# Patient Record
Sex: FEMALE | Race: WHITE | Employment: FULL TIME | URBAN - METROPOLITAN AREA
[De-identification: names, ages, dates, MRNs, and addresses within clinical notes are randomized per-mention and may not be internally consistent; named-entity substitution may affect disease eponyms.]

---

## 2021-01-22 ENCOUNTER — TRANSCRIBE ORDER (OUTPATIENT)
Dept: SCHEDULING | Age: 56
End: 2021-01-22

## 2021-01-22 DIAGNOSIS — Z12.31 SCREENING MAMMOGRAM FOR HIGH-RISK PATIENT: Primary | ICD-10-CM

## 2021-04-02 ENCOUNTER — HOSPITAL ENCOUNTER (EMERGENCY)
Age: 56
Discharge: HOME | End: 2021-04-02
Attending: EMERGENCY MEDICINE
Payer: COMMERCIAL

## 2021-04-02 VITALS
SYSTOLIC BLOOD PRESSURE: 139 MMHG | HEART RATE: 56 BPM | OXYGEN SATURATION: 100 % | BODY MASS INDEX: 24.29 KG/M2 | TEMPERATURE: 98.2 F | RESPIRATION RATE: 18 BRPM | WEIGHT: 164 LBS | HEIGHT: 69 IN | DIASTOLIC BLOOD PRESSURE: 79 MMHG

## 2021-04-02 DIAGNOSIS — H43.392 VITREOUS FLOATERS OF LEFT EYE: Primary | ICD-10-CM

## 2021-04-02 PROCEDURE — 99283 EMERGENCY DEPT VISIT LOW MDM: CPT

## 2021-04-02 NOTE — ED NOTES
I have reviewed discharge instructions with the patient. The patient verbalized understanding. Patient left ED via Discharge Method: ambulatory to Home with family member. Opportunity for questions and clarification provided. Patient given 0 scripts. To continue your aftercare when you leave the hospital, you may receive an automated call from our care team to check in on how you are doing. This is a free service and part of our promise to provide the best care and service to meet your aftercare needs.  If you have questions, or wish to unsubscribe from this service please call [phone]. Thank you for Choosing our [facility] Emergency Department.

## 2021-04-02 NOTE — ED TRIAGE NOTES
Pt reports a \"floater\" in her left eye field x 1 week, now with a \"vail\" over her entire left field of vision since this morning.

## 2021-04-02 NOTE — ED PROVIDER NOTES
Patient is a 43-year-old female with no significant past medical history who comes to the emergency department today complaining of a \"floater\" in the left eye for a week or so. Today she noted a bit of a cloud or a veil over her vision in the left eye as well. There is no eye pain. No trauma or injury. She is not on anticoagulants. Her vision is fine with her glasses on. She works here at the hospital at [employer] and called in today and they told her she needed to come to the emergency department. The history is provided by the patient. Eye Problem   This is a chronic problem. The current episode started more than 1 week ago. The problem has been gradually worsening. The left eye is affected. The injury mechanism was none. The patient is experiencing no pain. There is no history of trauma to the eye. There is no known exposure to pink eye. Associated symptoms include decreased vision. Pertinent negatives include no numbness, no discharge, no double vision, no foreign body sensation, no photophobia, no eye redness, no nausea, no vomiting, no weakness, no fever, no pain and no head injury. She has tried nothing for the symptoms.         Past Medical History:   Diagnosis Date    Anxiety     Depression     Osteoporosis     Pneumonia     Sleep disorder     Stomach ulcer     UTI (urinary tract infection)        Past Surgical History:   Procedure Laterality Date    HX HYSTERECTOMY  2007    HX KNEE REPLACEMENT Right 2014    HX ORTHOPAEDIC Left 2002    $ surgeries left hand same yr    MO ABDOMEN SURGERY PROC UNLISTED           Family History:   Problem Relation Age of Onset   Gillian Schultz COPD Mother     Heart Disease Mother     Hypertension Mother     Cancer Father         colon ca    Cancer Maternal Grandfather         skin ca       Social History     Socioeconomic History    Marital status: SINGLE     Spouse name: Not on file    Number of children: Not on file    Years of education: Not on file    Highest education level: Not on file   Occupational History    Not on file   Social Needs    Financial resource strain: Not on file    Food insecurity     Worry: Not on file     Inability: Not on file    Transportation needs     Medical: Not on file     Non-medical: Not on file   Tobacco Use    Smoking status: Former Smoker     Packs/day: 0.50     Years: 10.00     Pack years: 5.00     Types: Cigarettes     Quit date:      Years since quittin.2    Smokeless tobacco: Never Used   Substance and Sexual Activity    Alcohol use: Not Currently     Frequency: Monthly or less     Comment: occ beer    Drug use: Not on file    Sexual activity: Not on file   Lifestyle    Physical activity     Days per week: Not on file     Minutes per session: Not on file    Stress: Not on file   Relationships    Social connections     Talks on phone: Not on file     Gets together: Not on file     Attends Gnosticist service: Not on file     Active member of club or organization: Not on file     Attends meetings of clubs or organizations: Not on file     Relationship status: Not on file    Intimate partner violence     Fear of current or ex partner: Not on file     Emotionally abused: Not on file     Physically abused: Not on file     Forced sexual activity: Not on file   Other Topics Concern    Not on file   Social History Narrative    Not on file         ALLERGIES: Cephalexin, Penicillins, and Abilify [aripiprazole]    Review of Systems   Constitutional: Negative for chills, fatigue and fever. HENT: Negative for congestion, rhinorrhea and sore throat. Eyes: Positive for visual disturbance. Negative for double vision, photophobia, pain, discharge, redness and itching. Respiratory: Negative for cough and shortness of breath. Cardiovascular: Negative for chest pain and palpitations. Gastrointestinal: Negative for abdominal pain, diarrhea, nausea and vomiting.    Endocrine: Negative for polydipsia and polyuria. Genitourinary: Negative for dysuria, frequency and urgency. Musculoskeletal: Negative for back pain and neck pain. Skin: Negative for rash. Neurological: Negative for seizures, syncope, weakness and numbness. Hematological: Negative. Vitals:    04/02/21 1559   BP: (!) 140/83   Pulse: (!) 56   Resp: 18   Temp: 98.2 °F (36.8 °C)   SpO2: 100%   Weight: 74.4 kg (164 lb)   Height: 5' 9\" (1.753 m)            Physical Exam  Vitals signs and nursing note reviewed. Constitutional:       Appearance: She is well-developed. HENT:      Head: Normocephalic and atraumatic. Eyes:      General: Lids are normal. Lids are everted, no foreign bodies appreciated. Vision grossly intact. Gaze aligned appropriately. No visual field deficit or scleral icterus. Extraocular Movements: Extraocular movements intact. Conjunctiva/sclera: Conjunctivae normal.      Pupils: Pupils are equal, round, and reactive to light. Visual Fields: Right eye visual fields normal and left eye visual fields normal.   Neck:      Musculoskeletal: Normal range of motion and neck supple. No muscular tenderness. Cardiovascular:      Pulses: Normal pulses. Abdominal:      Tenderness: There is no guarding or rebound. Lymphadenopathy:      Cervical: No cervical adenopathy. Skin:     General: Skin is warm and dry. Findings: No rash. Neurological:      General: No focal deficit present. Mental Status: She is alert and oriented to person, place, and time. GCS: GCS eye subscore is 4. GCS verbal subscore is 5. GCS motor subscore is 6. Cranial Nerves: No cranial nerve deficit. Sensory: No sensory deficit. Motor: No weakness. Coordination: Coordination normal.          MDM  Number of Diagnoses or Management Options  Diagnosis management comments: I wore appropriate PPE throughout this patient's ED visit.  James Scott MD, 4:37 PM      5:15 PM  I discussed the case with Dr. Marito Briseno who is on-call for ophthalmology. He thinks this likely represents a vitreous detachment however since it is changing he would like to further evaluate you with the retina he will see the patient in his office tomorrow morning at 10 AM.    Voice dictation software was used during the making of this note. This software is not perfect and grammatical and other typographical errors may be present. This note has been proofread, but may still contain errors.   Sonny Hanna MD; 4/2/2021 @5:16 PM   ===================================================================           Amount and/or Complexity of Data Reviewed  Discuss the patient with other providers: yes    Risk of Complications, Morbidity, and/or Mortality  Presenting problems: low  Diagnostic procedures: minimal    Patient Progress  Patient progress: stable         Procedures

## 2021-04-05 ENCOUNTER — PATIENT OUTREACH (OUTPATIENT)
Dept: OTHER | Age: 56
End: 2021-04-05

## 2021-04-05 NOTE — PROGRESS NOTES
Patient on report as eligible for Case Management. Left discreet message on voicemail with this CM contact information. Will attempt to contact again to offer [phone] Management services.

## 2021-04-06 ENCOUNTER — PATIENT OUTREACH (OUTPATIENT)
Dept: OTHER | Age: 56
End: 2021-04-06

## 2021-04-06 NOTE — LETTER
Ms. Osiel Robbins [address] [place] [id number] Dear Queen Candelario, My name is Fortunato Benítez LPN, Associate Care Manager for [facility] and I have been trying to reach you, for follow up after your recent ER visit. The Associate Care Management (ACM) program is a free-of-charge confidential service provided to our associates and their family members covered by the [facility]. The program will provide an associate and his/her family with the [address] [place] [address] expertise to assist in navigating the health care delivery system, provider services, and their overall care needsso as to assure and improve health care interactions and enhance the quality of life. This program is designed to provide you with the opportunity to have a [facility] partner with you for the following services: 
 
 1) when you come home from the hospital or emergency room 2) when help is needed to manage your disease 3) when you need assistance coordinating services or appointments 4) when you need additional education, resources or assistance reaching your Be Well Health Program goals/requirements such as Be Well With Diabetes [address] [place] [address] is dedicated to empowering the good health of its community and improving the quality of care and care experiences for associates and their families. We are committed to safeguarding patient confidentiality and privacy, assuring that every associate has the respect he or she deserves in managing their health. The information shared with your care manager will not be shared with anyone else aside from those you identify as part of your care team, and will only be used to assist you with any identified care needs. Please contact me if you would like this service provided to you. Sincerely, Fortunato Benítez LPN  [facility] Care Coordinator [address] [place] [address] [address] [phone] F [phone]  [email or web address]

## 2021-04-06 NOTE — PROGRESS NOTES
Patient identified as eligible for [address] [place] [address] services. Second telephone outreach attempted. Left discreet voicemail with this CM confidential contact information. Will send UTR letter.

## 2021-05-06 ENCOUNTER — PATIENT OUTREACH (OUTPATIENT)
Dept: OTHER | Age: 56
End: 2021-05-06

## 2022-04-19 PROBLEM — R60.9 SWELLING: Status: ACTIVE | Noted: 2022-04-19

## 2022-04-20 ENCOUNTER — TRANSCRIBE ORDER (OUTPATIENT)
Dept: SCHEDULING | Age: 57
End: 2022-04-20

## 2022-04-20 DIAGNOSIS — Z12.31 VISIT FOR SCREENING MAMMOGRAM: Primary | ICD-10-CM

## 2022-05-05 ENCOUNTER — HOSPITAL ENCOUNTER (OUTPATIENT)
Dept: GENERAL RADIOLOGY | Age: 57
Discharge: HOME | End: 2022-05-05

## 2022-05-05 DIAGNOSIS — R06.02 SOB (SHORTNESS OF BREATH): ICD-10-CM

## 2022-05-05 NOTE — PROGRESS NOTES
I left pt a detailed message letting her know her x-ilya was normal and I asked her to call me back to let us know if the inhaler is helping

## 2022-05-06 NOTE — PROGRESS NOTES
Pt called back and left me a message letting me know she got my message re: her chest -ray and she said the inhaler is helping

## 2022-05-23 RX ORDER — CITALOPRAM 40 MG/1
TABLET ORAL
Qty: 30 TABLET | Refills: 0 | OUTPATIENT
Start: 2022-05-23

## 2022-05-25 RX ORDER — BUPROPION HYDROCHLORIDE 300 MG/1
TABLET ORAL
Qty: 90 TABLET | Refills: 1 | Status: SHIPPED | OUTPATIENT
Start: 2022-05-25

## 2022-06-07 DIAGNOSIS — Z12.31 ENCOUNTER FOR SCREENING MAMMOGRAM FOR MALIGNANT NEOPLASM OF BREAST: Primary | ICD-10-CM

## 2022-06-22 RX ORDER — CITALOPRAM 40 MG/1
40 TABLET ORAL DAILY
Qty: 90 TABLET | Refills: 1 | Status: SHIPPED | OUTPATIENT
Start: 2022-06-22

## 2022-06-22 RX ORDER — CITALOPRAM 40 MG/1
TABLET ORAL
Qty: 20 TABLET | Refills: 0 | OUTPATIENT
Start: 2022-06-22

## 2022-07-06 ENCOUNTER — HOSPITAL ENCOUNTER (OUTPATIENT)
Dept: MAMMOGRAPHY | Age: 57
Discharge: HOME | End: 2022-07-09
Payer: COMMERCIAL

## 2022-07-06 DIAGNOSIS — Z12.31 ENCOUNTER FOR SCREENING MAMMOGRAM FOR MALIGNANT NEOPLASM OF BREAST: ICD-10-CM

## 2022-07-06 PROCEDURE — 77067 SCR MAMMO BI INCL CAD: CPT

## 2022-07-15 ENCOUNTER — APPOINTMENT (OUTPATIENT)
Dept: MAMMOGRAPHY | Age: 57
End: 2022-07-15
Payer: COMMERCIAL

## 2022-07-15 ENCOUNTER — HOSPITAL ENCOUNTER (OUTPATIENT)
Dept: MAMMOGRAPHY | Age: 57
Discharge: HOME | End: 2022-07-18
Payer: COMMERCIAL

## 2022-07-15 DIAGNOSIS — R92.8 ABNORMAL SCREENING MAMMOGRAM: ICD-10-CM

## 2022-07-15 PROCEDURE — 77065 DX MAMMO INCL CAD UNI: CPT

## 2022-07-15 PROCEDURE — 76642 ULTRASOUND BREAST LIMITED: CPT

## 2022-07-19 ENCOUNTER — APPOINTMENT (OUTPATIENT)
Dept: MAMMOGRAPHY | Age: 57
End: 2022-07-19
Payer: COMMERCIAL

## 2022-07-19 ENCOUNTER — HOSPITAL ENCOUNTER (OUTPATIENT)
Dept: MAMMOGRAPHY | Age: 57
Discharge: HOME | End: 2022-07-22
Payer: COMMERCIAL

## 2022-07-19 VITALS — SYSTOLIC BLOOD PRESSURE: 140 MMHG | DIASTOLIC BLOOD PRESSURE: 67 MMHG

## 2022-07-19 DIAGNOSIS — R92.8 ABNORMAL MAMMOGRAM OF RIGHT BREAST: ICD-10-CM

## 2022-07-19 DIAGNOSIS — N63.0 BREAST MASS: ICD-10-CM

## 2022-07-19 PROCEDURE — 77065 DX MAMMO INCL CAD UNI: CPT

## 2022-07-19 PROCEDURE — 2500000003 HC RX 250 WO HCPCS: Performed by: PHYSICIAN ASSISTANT

## 2022-07-19 PROCEDURE — 2709999900 US BREAST BIOPSY W LOC DEVICE 1ST LESION RIGHT

## 2022-07-19 PROCEDURE — 88305 TISSUE EXAM BY PATHOLOGIST: CPT

## 2022-07-19 RX ORDER — LIDOCAINE HYDROCHLORIDE 10 MG/ML
5 INJECTION, SOLUTION INFILTRATION; PERINEURAL ONCE
Status: COMPLETED | OUTPATIENT
Start: 2022-07-19 | End: 2022-07-19

## 2022-07-19 RX ADMIN — LIDOCAINE HYDROCHLORIDE 5 ML: 10 INJECTION, SOLUTION INFILTRATION; PERINEURAL at 09:32

## 2022-07-19 ASSESSMENT — PAIN SCALES - GENERAL: PAINLEVEL_OUTOF10: 4

## 2022-07-21 ENCOUNTER — CLINICAL DOCUMENTATION (OUTPATIENT)
Dept: MAMMOGRAPHY | Age: 57
End: 2022-07-21

## 2022-07-21 NOTE — PROGRESS NOTES
Dr Fito Varghese and I spoke with Ms Codey Hollis and her partner regarding the results of her breast biopsy. She was aware of her results via [address] Pathology: IDC w/ lobular features. She had no post biopsy issues or concerns. She is scheduled for an MRI 7-27 @ 6:30 pm and will be contacted by the cancer center within 48 hours.

## 2022-07-25 ENCOUNTER — TELEPHONE (OUTPATIENT)
Dept: CASE MANAGEMENT | Age: 57
End: 2022-07-25

## 2022-07-25 SDOH — HEALTH STABILITY: PHYSICAL HEALTH: ON AVERAGE, HOW MANY DAYS PER WEEK DO YOU ENGAGE IN MODERATE TO STRENUOUS EXERCISE (LIKE A BRISK WALK)?: 3 DAYS

## 2022-07-25 SDOH — SOCIAL STABILITY: SOCIAL NETWORK
IN A TYPICAL WEEK, HOW MANY TIMES DO YOU TALK ON THE PHONE WITH FAMILY, FRIENDS, OR NEIGHBORS?: MORE THAN THREE TIMES A WEEK

## 2022-07-25 SDOH — ECONOMIC STABILITY: INCOME INSECURITY: IN THE LAST 12 MONTHS, WAS THERE A TIME WHEN YOU WERE NOT ABLE TO PAY THE MORTGAGE OR RENT ON TIME?: NO

## 2022-07-25 SDOH — ECONOMIC STABILITY: TRANSPORTATION INSECURITY
IN THE PAST 12 MONTHS, HAS THE LACK OF TRANSPORTATION KEPT YOU FROM MEDICAL APPOINTMENTS OR FROM GETTING MEDICATIONS?: NO

## 2022-07-25 SDOH — SOCIAL STABILITY: SOCIAL NETWORK: ARE YOU MARRIED, WIDOWED, DIVORCED, SEPARATED, NEVER MARRIED, OR LIVING WITH A PARTNER?: LIVING WITH PARTNER

## 2022-07-25 SDOH — HEALTH STABILITY: PHYSICAL HEALTH: ON AVERAGE, HOW MANY MINUTES DO YOU ENGAGE IN EXERCISE AT THIS LEVEL?: 30 MIN

## 2022-07-25 SDOH — HEALTH STABILITY: MENTAL HEALTH
STRESS IS WHEN SOMEONE FEELS TENSE, NERVOUS, ANXIOUS, OR CAN'T SLEEP AT NIGHT BECAUSE THEIR MIND IS TROUBLED. HOW STRESSED ARE YOU?: NOT AT ALL

## 2022-07-25 SDOH — SOCIAL STABILITY: SOCIAL INSECURITY
WITHIN THE LAST YEAR, HAVE YOU BEEN KICKED, HIT, SLAPPED, OR OTHERWISE PHYSICALLY HURT BY YOUR PARTNER OR EX-PARTNER?: NO

## 2022-07-25 SDOH — SOCIAL STABILITY: SOCIAL INSECURITY: WITHIN THE LAST YEAR, HAVE YOU BEEN HUMILIATED OR EMOTIONALLY ABUSED IN OTHER WAYS BY YOUR PARTNER OR EX-PARTNER?: NO

## 2022-07-25 SDOH — ECONOMIC STABILITY: INCOME INSECURITY: HOW HARD IS IT FOR YOU TO PAY FOR THE VERY BASICS LIKE FOOD, HOUSING, MEDICAL CARE, AND HEATING?: NOT VERY HARD

## 2022-07-25 SDOH — SOCIAL STABILITY: SOCIAL INSECURITY
WITHIN THE LAST YEAR, HAVE TO BEEN RAPED OR FORCED TO HAVE ANY KIND OF SEXUAL ACTIVITY BY YOUR PARTNER OR EX-PARTNER?: NO

## 2022-07-25 SDOH — ECONOMIC STABILITY: HOUSING INSECURITY
IN THE LAST 12 MONTHS, WAS THERE A TIME WHEN YOU DID NOT HAVE A STEADY PLACE TO SLEEP OR SLEPT IN A SHELTER (INCLUDING NOW)?: NO

## 2022-07-25 SDOH — ECONOMIC STABILITY: FOOD INSECURITY: WITHIN THE PAST 12 MONTHS, THE FOOD YOU BOUGHT JUST DIDN'T LAST AND YOU DIDN'T HAVE MONEY TO GET MORE.: NEVER TRUE

## 2022-07-25 SDOH — ECONOMIC STABILITY: FOOD INSECURITY: WITHIN THE PAST 12 MONTHS, YOU WORRIED THAT YOUR FOOD WOULD RUN OUT BEFORE YOU GOT MONEY TO BUY MORE.: NEVER TRUE

## 2022-07-25 SDOH — SOCIAL STABILITY: SOCIAL NETWORK: HOW OFTEN DO YOU GET TOGETHER WITH FRIENDS OR RELATIVES?: MORE THAN THREE TIMES A WEEK

## 2022-07-25 SDOH — ECONOMIC STABILITY: TRANSPORTATION INSECURITY
IN THE PAST 12 MONTHS, HAS LACK OF TRANSPORTATION KEPT YOU FROM MEETINGS, WORK, OR FROM GETTING THINGS NEEDED FOR DAILY LIVING?: NO

## 2022-07-25 SDOH — SOCIAL STABILITY: SOCIAL INSECURITY: WITHIN THE LAST YEAR, HAVE YOU BEEN AFRAID OF YOUR PARTNER OR EX-PARTNER?: NO

## 2022-07-25 ASSESSMENT — PATIENT HEALTH QUESTIONNAIRE - PHQ9
2. FEELING DOWN, DEPRESSED OR HOPELESS: 0
1. LITTLE INTEREST OR PLEASURE IN DOING THINGS: 0
SUM OF ALL RESPONSES TO PHQ QUESTIONS 1-9: 0
SUM OF ALL RESPONSES TO PHQ QUESTIONS 1-9: 0
SUM OF ALL RESPONSES TO PHQ9 QUESTIONS 1 & 2: 0
SUM OF ALL RESPONSES TO PHQ QUESTIONS 1-9: 0
SUM OF ALL RESPONSES TO PHQ QUESTIONS 1-9: 0

## 2022-07-25 NOTE — TELEPHONE ENCOUNTER
7/25/2022  Breast Navigation  intake complete for New Patient Breast Cancer. Reviewed role of navigation, gave contact information for navigators, discussed pathology report and receptor status, reviewed upcoming appointments. Patient is employed as , on break for summer. Independent in self care  No physical limitations. Barriers:  No psychosocial,or transportation barriers noted. Will send patient financial aid application as she states her copays and deductibles have been costly. Lives with partner Judy Lee who is her primary support person. Verbal permission given to speak with Judy Lee and her brother Chava Salmeron. Family history of breast cancer:  None. Father with history of colon cancer, Maternal GM with  history of Melanoma. Type of cancer: Right breast IDC, low grade. MRI pending for 7-27-22. Social determinants of health and Depression screen complete. Confirmed PCP:  BERTRAND Sarmiento  Added MD and Navigator to treatment team   Appointment with Oncology: Dr. Joe Hebert 7-29-22 at 09:00. Appointment with Surgery: Dr. Bogdan Byrd 8-4-22 at 11:45 am.  Breast Multidisciplinary Conference presentation date:  pending for 8-4-22. My Chart message to patient with navigation contact information and upcoming appointments. Routed note to PCP regarding intake and upcoming appointments.

## 2022-07-26 ENCOUNTER — TELEPHONE (OUTPATIENT)
Dept: INTERNAL MEDICINE CLINIC | Facility: CLINIC | Age: 57
End: 2022-07-26

## 2022-07-26 NOTE — TELEPHONE ENCOUNTER
----- Message from Clancy Gitelman sent at 7/26/2022 11:53 AM EDT -----  Subject: Message to Provider    QUESTIONS  Information for Provider? PT had a mammogram and would like to discuss it   with the Nurse, contact# [phone]  ---------------------------------------------------------------------------  --------------  [address]  [phone]; OK to leave message on voicemail  ---------------------------------------------------------------------------  --------------  SCRIPT ANSWERS  Relationship to Patient?  Self Left message to call back  Which med?

## 2022-07-26 NOTE — TELEPHONE ENCOUNTER
Sital please advise.  Pt sent message to discuss her recent mammogram  I see pt has her MRI set for tomorrow and that the nurse Navigator had called pt to discuss recent dx of breast cancer

## 2022-07-27 ENCOUNTER — HOSPITAL ENCOUNTER (OUTPATIENT)
Dept: MRI IMAGING | Age: 57
Discharge: HOME | End: 2022-07-30
Payer: COMMERCIAL

## 2022-07-27 DIAGNOSIS — C50.911 INFILTRATING DUCTAL CARCINOMA OF BREAST, RIGHT (HCC): ICD-10-CM

## 2022-07-27 PROCEDURE — 6360000004 HC RX CONTRAST MEDICATION: Performed by: PHYSICIAN ASSISTANT

## 2022-07-27 PROCEDURE — C8908 MRI W/O FOL W/CONT, BREAST,: HCPCS

## 2022-07-27 PROCEDURE — A9579 GAD-BASE MR CONTRAST NOS,1ML: HCPCS | Performed by: PHYSICIAN ASSISTANT

## 2022-07-27 RX ADMIN — GADOTERIDOL 16 ML: 279.3 INJECTION, SOLUTION INTRAVENOUS at 19:22

## 2022-07-27 NOTE — PROGRESS NOTES
New Patient Abstract    Reason for Referral: Breast cancer    Referring Provider: BERTRAND Horan    Primary Care Provider: BERTRAND Horan    Family History of Cancer/Hematologic Disorders: Family history is significant for father with colon cancer and maternal grandfather with skin cancer. Presenting Symptoms: Abnormal routine screening mammogram     Narrative with recent with Results/Procedures/Biopsies and Dates completed: Ms. Cristobal Brewer is a 72-year-old white female with a history of tobacco use (former ½ pack per day smoker; quit in 2012), stomach ulcer, osteoporosis, depression, anxiety, hysterectomy, and orthopedic surgery, who was recently diagnosed with breast cancer. She initially presented for a routine bilateral screening mammogram on 7/6/22 which identified a possible right breast architectural distortion. Further evaluation with right breast diagnostic mammogram and right breast ultrasound on 7/15/22 confirmed a 1.6 cm maximal diameter heterogeneous hypoechoic mass by ultrasound and suspicious features by mammography. Recommended core needle biopsy and marker clip placement for the 1.6 cm irregular mass at the 10:00 position in the right breast was performed on 7/19/22 with pathology revealing ER 90%/OH 33% positive, HER-2 (1+) negative, low grade (well differentiated), infiltrating ductal carcinoma with lobular features and no definite in situ component or lymphovascular invasion identified. MRI of the bilateral breasts was completed on 7/27/22 demonstrating a 1.7 cm right breast mass at the 10:00 position compatible with the patient's known neoplasm. No additional suspicious abnormality was identified. Ms. Cristobal Brewer is now referred to [facility] for Medical Oncology evaluation and treatment of newly diagnosed breast cancer. She was also referred to surgery and is scheduled for initial surgical consultation with Dr. Whitley Lau on 8/4/22.      BILATERAL DIGITAL SCREENING MAMMOGRAM WITH CAD 7/6/22  FINDINGS: Bilateral digital mammography was performed, and is interpreted in conjunction with a computer assisted detection (CAD) system. The breast parenchyma is heterogeneously dense which may limit the detection of small masses. There is suspicion for architectural distortion within the upper outer right breast, mid to posterior depth. Spot compression views in the CC and MLO projection as well as a full 90 degree mediolateral view are recommended for further evaluation. If this finding persists an ultrasound should be considered. There are no suspicious clustered microcalcifications. IMPRESSION: Possible right breast architectural distortion. BI-RADS Assessment Category 0: Incomplete: Needs additional imaging evaluation. RIGHT BREAST DIAGNOSTIC MAMMOGRAM AND RIGHT BREAST ULTRASOUND, 7/15/2022  FINDINGS:  RIGHT BREAST DIAGNOSTIC MAMMOGRAM: Straight mediolateral view of the right breast as well as compression images of the right breast in the CC, and MLO plane are submitted for evaluation. Previously, question of architectural changes were seen in the upper outer, mid right breast. These are once again suggested following focal compression. Further evaluation with focused diagnostic ultrasound is recommended  RIGHT BREAST ULTRASOUND:  FINDINGS: Focused ultrasound imaging at the 10 o'clock position of the right breast in the area of suggested architectural changes as seen on the recent mammogram imaging shows an irregular marginated heterogeneous, mildly shadowing mass measuring 1.6 cm x 1.3 cm x 0.2 cm. IMPRESSION:  1. 1.6 cm maximal diameter heterogeneous hypoechoic mass by ultrasound also demonstrating suspicious features by mammography. Further evaluation with ultrasound-guided biopsy is recommended. BI-RADS Assessment Category 4c: Suspicious Finding- Biopsy should be considered.     [facility] SURGICAL PATHOLOGY REPORT 7/19/22  DIAGNOSIS   A: \" RIGHT BREAST, 10:00 POSITION, 8 CM FROM NIPPLE, CORE BIOPSY\": INFILTRATING DUCTAL CARCINOMA WITH LOBULAR FEATURES, LOW GRADE (WELL DIFFERENTIATED). DEFINITE IN SITU COMPONENT AND LYMPHOVASCULAR INVASION ARE NOT IDENTIFIED        MRI OF THE BILATERAL BREASTS 7/27/22  FINDINGS: The breasts demonstrate moderate glandularity and minimal background enhancement. There is an enhancing mass with irregular margins at the 10:00 position right breast corresponding to the site of biopsy on recent imaging. This measures approximately 1.7 x 1.5 x 1.7 cm in AP, transverse and craniocaudal dimensions, respectively. There is a focus of susceptibility artifact within the mass compatible with clip placement. No additional suspicious abnormality was identified within either breast. There is no evidence of axillary or internal mammary lymphadenopathy. Elsewhere, limited visualization of the partially included thorax and upper abdomen shows no acute abnormality. IMPRESSION: 1.7 cm right breast mass at 10:00 compatible with the patient's known neoplasm. No additional suspicious abnormality was identified. Clinical management is recommended. BI-RADS Assessment Category 6: Known Biopsy Proven Malignancy    Notes from Referring Provider: None    Other Pertinent Information:   06/19/2021 (COVID-19, J&J, (age 18y+), IM, 0.5 mL)    Presented at Tumor Board:  Patient is scheduled to be presented at tumor board on 8/4/22.

## 2022-07-28 NOTE — TELEPHONE ENCOUNTER
Spoke to pt this morning and she has appt with oncology and surgery next week. Pt please thus far with care she has been given and appreciative of the call today. Advised to call if any further question would try to help if I could, and she is in may prayers.

## 2022-07-29 ENCOUNTER — OFFICE VISIT (OUTPATIENT)
Dept: ONCOLOGY | Age: 57
End: 2022-07-29
Payer: COMMERCIAL

## 2022-07-29 VITALS
HEIGHT: 68 IN | TEMPERATURE: 98.2 F | OXYGEN SATURATION: 100 % | HEART RATE: 61 BPM | BODY MASS INDEX: 25.96 KG/M2 | DIASTOLIC BLOOD PRESSURE: 81 MMHG | SYSTOLIC BLOOD PRESSURE: 126 MMHG | WEIGHT: 171.3 LBS

## 2022-07-29 DIAGNOSIS — Z17.0 MALIGNANT NEOPLASM OF UPPER-OUTER QUADRANT OF RIGHT BREAST IN FEMALE, ESTROGEN RECEPTOR POSITIVE (HCC): Primary | ICD-10-CM

## 2022-07-29 DIAGNOSIS — C50.411 MALIGNANT NEOPLASM OF UPPER-OUTER QUADRANT OF RIGHT BREAST IN FEMALE, ESTROGEN RECEPTOR POSITIVE (HCC): Primary | ICD-10-CM

## 2022-07-29 PROCEDURE — 99204 OFFICE O/P NEW MOD 45 MIN: CPT | Performed by: INTERNAL MEDICINE

## 2022-07-29 ASSESSMENT — PATIENT HEALTH QUESTIONNAIRE - PHQ9
SUM OF ALL RESPONSES TO PHQ QUESTIONS 1-9: 0
2. FEELING DOWN, DEPRESSED OR HOPELESS: 0
SUM OF ALL RESPONSES TO PHQ QUESTIONS 1-9: 0

## 2022-07-29 NOTE — PROGRESS NOTES
[facility] Hematology and Oncology: Office Visit New Patient H & P    Chief Complaint:    Chief Complaint   Patient presents with    New Patient         History of Present Illness:  Ms. Avila Sandoval is a 62 y.o. female who presents today for evaluation regarding breast cancer. She initially presented for a routine bilateral screening mammogram on 7/6/22 which identified a possible right breast architectural distortion. Further evaluation with right breast diagnostic mammogram and right breast ultrasound on 7/15/22 confirmed a 1.6 cm mass by ultrasound and suspicious features by mammography. Biopsy confirmed low grade IDC with lobular features, ER 90%/HI 33% positive, HER-2 (1+) negative. MRI of the bilateral breasts was completed on 7/27/22 demonstrating the mass to be 1.7 cm with no other suspicious findings. Ms. Avila Sandoval is now referred to [facility] for Medical Oncology evaluation and treatment of newly diagnosed breast cancer. She was also referred to surgery and is scheduled for initial surgical consultation with Dr. Jose King on 8/4/22. Review of Systems:  Constitutional: Negative. HENT: Negative. Eyes: Negative. Respiratory: Negative. Cardiovascular: Negative. Gastrointestinal: Negative. Genitourinary: Negative. Musculoskeletal: Negative. Skin: Negative. Neurological: Negative. Endo/Heme/Allergies: Negative. Psychiatric/Behavioral: Negative. All other systems reviewed and are negative.      Allergies   Allergen Reactions    Cephalexin Rash    Droperidol Other (See Comments)     Seizure  Seizure      Penicillins Rash    Aripiprazole Other (See Comments)     Bad reaction     Past Medical History:   Diagnosis Date    Anxiety     Depression     Osteoporosis     Pneumonia     Sleep disorder     Stomach ulcer     UTI (urinary tract infection)      Past Surgical History:   Procedure Laterality Date    BREAST BIOPSY Right 07/19/2022    US Core Bx    HYSTERECTOMY (CERVIX STATUS UNKNOWN)  2007 ORTHOPEDIC SURGERY Left 2002    $ surgeries left hand same yr    [place] ABDOMEN SURGERY PROC UNLISTED      TOTAL KNEE ARTHROPLASTY Right 2014    US BREAST NEEDLE BIOPSY RIGHT Right 7/19/2022    US BREAST NEEDLE BIOPSY RIGHT 7/19/2022 Muriel Patel MD [facility]     Family History   Problem Relation Age of Onset    COPD Mother     Heart Disease Mother     Hypertension Mother     Cancer Maternal Grandfather         skin ca    Cancer Father         colon ca     Social History     Socioeconomic History    Marital status: Single     Spouse name: Not on file    Number of children: Not on file    Years of education: Not on file    Highest education level: Not on file   Occupational History    Not on file   Tobacco Use    Smoking status: Former     Packs/day: 0.50     Types: Cigarettes     Quit date: 1/1/2012     Years since quitting: 10.5    Smokeless tobacco: Never   Substance and Sexual Activity    Alcohol use: Not Currently    Drug use: Not on file    Sexual activity: Not on file   Other Topics Concern    Not on file   Social History Narrative    Not on file     Social Determinants of Health     Financial Resource Strain: Low Risk     Difficulty of Paying Living Expenses: Not very hard   Food Insecurity: No Food Insecurity    Worried About Running Out of Food in the Last Year: Never true    [address] in the Last Year: Never true   Transportation Needs: No Transportation Needs    Lack of Transportation (Medical): No    Lack of Transportation (Non-Medical):  No   Physical Activity: Insufficiently Active    Days of Exercise per Week: 3 days    Minutes of Exercise per Session: 30 min   Stress: No Stress Concern Present    Feeling of Stress : Not at all   Social Connections: Unknown    Frequency of Communication with Friends and Family: More than three times a week    Frequency of Social Gatherings with Friends and Family: More than three times a week    Attends [employer] Services: Not on file    Active Member of CLINICAL INDICATION:  Recent diagnosis of right breast IDC with lobular features at 10:00 position right breast. COMPARISON: Mammogram 07/06/2022, mammograms and ultrasound 07/15/2022. Breast biopsy and postbiopsy images 07/19/2022. TECHNIQUE: Standard MRI sequences were obtained through the breasts in multiple planes. Images were obtained before and after intravenous infusion of 16 mL of Prohance contrast. Images were reviewed with PACS and with [employer] CAD software. FINDINGS: The breasts demonstrate moderate glandularity and minimal background enhancement. There is an enhancing mass with irregular margins at the 10:00 position right breast corresponding to the site of biopsy on recent imaging. This measures approximately 1.7 x 1.5 x 1.7 cm in AP, transverse and craniocaudal dimensions, respectively. There is a focus of susceptibility artifact within the mass compatible with clip placement. No additional suspicious abnormality was identified within either breast. There is no evidence of axillary or internal mammary lymphadenopathy. Elsewhere, limited visualization of the partially included thorax and upper abdomen shows no acute abnormality. 1.7 cm right breast mass at 10:00 compatible with the patient's known neoplasm. No additional suspicious abnormality was identified. Clinical management is recommended. BI-RADS Assessment Category 6: Known Biopsy Proven Malignancy     STEVEN DIGITAL SCREEN W OR WO CAD BILATERAL    Result Date: 7/7/2022  STUDY:  Bilateral digital screening mammogram with CAD INDICATION:  Screening;  no family history of breast carcinoma. COMPARISON:  None. Remote studies are reportedly unavailable. This will serve as the patient's new baseline study. FINDINGS: Bilateral digital mammography was performed, and is interpreted in conjunction with a computer assisted detection (CAD) system. The breast parenchyma is heterogeneously dense which may limit the detection of small masses.  There is suspicion for architectural distortion within the upper outer right breast, mid to posterior depth. Spot compression views in the CC and MLO projection as well as a full 90 degree mediolateral view are recommended for further evaluation. If this finding persists an ultrasound should be considered. There are no suspicious clustered microcalcifications. Possible right breast architectural distortion. BI-RADS Assessment Category 0: Incomplete: Needs additional imaging evaluation. We will attempt to contact the patient and arrange for this additional imaging. A reminder letter will be sent to the patient at the appropriate time pending additional views. BD3 We are mailing breast density information to the patient along with the mammogram report. STEVEN DIGITAL DIAGNOSTIC W OR WO CAD RIGHT    Result Date: 7/15/2022  RIGHT BREAST DIAGNOSTIC MAMMOGRAM and RIGHT BREAST ULTRASOUND, 7/15/2022. CLINICAL HISTORY: Questionable architectural distortion seen on baseline screening mammogram. COMPARISON STUDY: Screening mammogram 7/6/2022. FINDINGS: RIGHT BREAST DIAGNOSTIC MAMMOGRAM: Straight mediolateral view of the right breast as well as compression images of the right breast in the CC, and MLO plane are submitted for evaluation. Previously, question of architectural changes were seen in the upper outer, mid right breast. These are once again suggested following focal compression. Further evaluation with focused diagnostic ultrasound is recommended. RIGHT BREAST ULTRASOUND, 7/15/2022. CLINICAL HISTORY: Right breast architectural distortion. Technique: Grayscale, and Doppler imaging of the right breast soft tissues was performed using a 15 MHz transducer. FINDINGS: Focused ultrasound imaging at the 10 o'clock position of the right breast in the area of suggested architectural changes as seen on the recent mammogram imaging shows an irregular marginated heterogeneous, mildly shadowing mass measuring 1.6 cm x 1.3 cm x 0.2 cm. 1. 1.6 cm maximal diameter heterogeneous hypoechoic mass by ultrasound also demonstrating suspicious features by mammography. Further evaluation with ultrasound-guided biopsy is recommended. BI-RADS Assessment Category 4c: Suspicious Finding- Biopsy should be considered. US BREAST LIMITED RIGHT    Result Date: 7/15/2022  RIGHT BREAST DIAGNOSTIC MAMMOGRAM and RIGHT BREAST ULTRASOUND, 7/15/2022. CLINICAL HISTORY: Questionable architectural distortion seen on baseline screening mammogram. COMPARISON STUDY: Screening mammogram 7/6/2022. FINDINGS: RIGHT BREAST DIAGNOSTIC MAMMOGRAM: Straight mediolateral view of the right breast as well as compression images of the right breast in the CC, and MLO plane are submitted for evaluation. Previously, question of architectural changes were seen in the upper outer, mid right breast. These are once again suggested following focal compression. Further evaluation with focused diagnostic ultrasound is recommended. RIGHT BREAST ULTRASOUND, 7/15/2022. CLINICAL HISTORY: Right breast architectural distortion. Technique: Grayscale, and Doppler imaging of the right breast soft tissues was performed using a 15 MHz transducer. FINDINGS: Focused ultrasound imaging at the 10 o'clock position of the right breast in the area of suggested architectural changes as seen on the recent mammogram imaging shows an irregular marginated heterogeneous, mildly shadowing mass measuring 1.6 cm x 1.3 cm x 0.2 cm.     1. 1.6 cm maximal diameter heterogeneous hypoechoic mass by ultrasound also demonstrating suspicious features by mammography. Further evaluation with ultrasound-guided biopsy is recommended. BI-RADS Assessment Category 4c: Suspicious Finding- Biopsy should be considered.      MAMMOGRAM POST BX CLIP PLACEMENT RIGHT    Addendum Date: 7/22/2022    Addendum: ADDENDUM, 7/19/2022: Pathology was noted as A: Right breast, 10:00 position, 8 cm from nipple, core biopsy: Infiltrating ductal carcinoma with lobular features, low grade (well differentiated). Definite in situ component and lymphovascular invasion are not identified. Results concordant with imaging. Pathology report was faxed to Dr. Abril Simons office on 7/20/2022 by LALA Lopez)([place]). Patient was referred to Oncology services on 7/21/2022. Result Date: 7/22/2022  RIGHT BREAST CORE NEEDLE BIOPSY AND MARKER CLIP PLACEMENT WITH ULTRASOUND GUIDANCE, RIGHT DIAGNOSTIC DIGITAL MAMMOGRAPHY: CLINICAL HISTORY:  Abnormal new baseline mammogram and ultrasound for histologic diagnosis. BIOPSY AND CLIP PLACEMENT:  Written informed consent was obtained. Preliminary ultrasound evaluation of the right axilla demonstrated no pathologically enlarged or dysmorphic nodes. Using standard aseptic technique and 1% Xylocaine local anesthesia, a 14-gauge Achieve core biopsy needle was used to obtain a total of 4 samples of the 1.6 cm irregular, heterogeneous mass with variable acoustical shadowing associated with mammographic architectural distortion at the 10:00 position 8 cm from the nipple confirmed at mammography and ultrasound on July 12, 2022. The samples were placed in formalin for histologic evaluation, and a titanium marker clip was placed through the cannula. POST-BIOPSY RIGHT MAMMOGRAM:  Craniocaudal and straight lateral views demonstrate the biopsy marker clip in expected position. The patient tolerated the procedure well without immediate complication and left the department in good condition. 1.  Successful core needle biopsy and marker clip placement for the 1.6 cm irregular mass at the 10:00 position. 2.  If histology fails to confirm malignancy, then followup surgical consultation would be indicated for ultrasound wire localization and excisional biopsy of this mass with very worrisome imaging characteristics. Thank you for referral of this patient.      US BREAST BIOPSY W LOC DEVICE 1ST LESION RIGHT    Addendum Date: 7/22/2022    Addendum: ADDENDUM, 7/19/2022: Pathology was noted as A: Right breast, 10:00 position, 8 cm from nipple, core biopsy: Infiltrating ductal carcinoma with lobular features, low grade (well differentiated). Definite in situ component and lymphovascular invasion are not identified. Results concordant with imaging. Pathology report was faxed to Dr. Workman Qualia office on 7/20/2022 by Baldwin Leyden, RT([facility])([place]). Patient was referred to Oncology services on 7/21/2022. Result Date: 7/22/2022  RIGHT BREAST CORE NEEDLE BIOPSY AND MARKER CLIP PLACEMENT WITH ULTRASOUND GUIDANCE, RIGHT DIAGNOSTIC DIGITAL MAMMOGRAPHY: CLINICAL HISTORY:  Abnormal new baseline mammogram and ultrasound for histologic diagnosis. BIOPSY AND CLIP PLACEMENT:  Written informed consent was obtained. Preliminary ultrasound evaluation of the right axilla demonstrated no pathologically enlarged or dysmorphic nodes. Using standard aseptic technique and 1% Xylocaine local anesthesia, a 14-gauge Achieve core biopsy needle was used to obtain a total of 4 samples of the 1.6 cm irregular, heterogeneous mass with variable acoustical shadowing associated with mammographic architectural distortion at the 10:00 position 8 cm from the nipple confirmed at mammography and ultrasound on July 12, 2022. The samples were placed in formalin for histologic evaluation, and a titanium marker clip was placed through the cannula. POST-BIOPSY RIGHT MAMMOGRAM:  Craniocaudal and straight lateral views demonstrate the biopsy marker clip in expected position. The patient tolerated the procedure well without immediate complication and left the department in good condition. 1.  Successful core needle biopsy and marker clip placement for the 1.6 cm irregular mass at the 10:00 position.  2.  If histology fails to confirm malignancy, then followup surgical consultation would be indicated for ultrasound wire localization and excisional biopsy of this mass with very worrisome imaging characteristics. Thank you for referral of this patient. ASSESSMENT:   Diagnosis Orders   1. Malignant neoplasm of upper-outer quadrant of right breast in female, estrogen receptor positive [facility])          Patient Active Problem List   Diagnosis    Depression    Anxiety    Gastroesophageal reflux disease without esophagitis    Swelling           PLAN:  Lab studies were personally reviewed. Pertinent old records were reviewed. Breast cancer: low grade, IDC with lobular features, 1.7 cm on MRI, right breast, ER/FL positive, HER2 1+. No suspicious lymphadenopathy. I discussed the pathophysiology and natural history of breast cancer with Ms. Radha Mcqueen. The usual treatment modalities employed for breast cancer include surgery, chemotherapy, radiation, or endocrine therapy in some combination. She is clinical T1cN0 and is an appropriate candidate for upfront surgery, lumpectomy vs mastectomy with sentinel node biopsy. If she is pathologic T1cN0, she would be an excellent candidate for Oncotype Dx for risk stratification. She would need radiation if BCT and will require endocrine therapy under any circumstance, AI due to postmenopause. She understands and agrees to proceed. All questions were asked and answered to the best of my ability. In all, I spent 45 minutes in the care of Ms. Radha Mcqueen today, over 50% of which was in direct counseling and coordination of care.           Yamileth Coronado MD, MD  [address] [place] [address] Hematology and Oncology  [address] [place] [address]  Office : [phone]  Fax : [phone]

## 2022-07-29 NOTE — PATIENT INSTRUCTIONS
Patient Instructions from Today's Visit    Reason for Visit:  New Patient Visit - [facility]    Diagnosis Information:  [email or web address] net/about-us/asco-answers-patient-education-materials/bymh-rygyflp-duqa-sheets  ASCO ANSWERS is a collection of oncologist-approved patient education materials developed by the [employer] of Clinical Oncology (ASCO) for people with cancer and their caregivers. Breast Cancer    What is breast cancer? Breast cancer begins when healthy breast cells change and grow out of control, usually forming a mass called a tumor. Breast cancer is the most common type of cancer diagnosed in women in the [facility] (excluding skin cancer). What are the parts of the breast?   Most of the breast is fatty tissue. However, it also contains a network of lobes that are made up of tiny, tube-like structures called lobules that contain milk glands. Tiny ducts connect the glands, lobules, and lobes, and carry milk from the lobes to the nipple. Most breast cancers begin in the cells lining the milk ducts and are called ductal carcinomas. The second most common type starts in the lobules and is called lobular carcinoma. What does stage mean? The stage is a way of describing where the cancer is located, how much the cancer has grown, and if or where it has spread. There are 5 stages for breast cancer: stage 0 (zero), which is called noninvasive cancer or ductal carcinoma in situ (DCIS), and stages I through IV (1 through 4). Descriptions and illustrations of these stages are available at www.cancer. net/breast.     How is breast cancer treated? The biology and behavior of a breast cancer affect the treatment plan, and every persons cancer is different.  Doctors consider many factors when recommending a treatment plan, including the cancers stage; the tumors human epidermal growth factor receptor 2 (HER2) status and the hormone receptor status, which includes estrogen receptors (ER) and progesterone receptors (NE); the presence of known mutations (changes) in breast cancer genes; and the womans age, general health, and whether she has experienced menopause. For earlier stages of cancer, surgery to remove the tumor and nearby lymph nodes usually is the first treatment. Additional treatment with chemotherapy, radiation therapy, hormonal therapy, or targeted therapy is usually given after surgery to lower the risk of the cancer returning. These treatments may also be given before surgery to shrink the size of the tumor. The treatment of cancer that has spread or come back after treatment depends on many factors. It can include the therapies listed above used in a different combination or at a different pace. When making treatment decisions, women may also consider a clinical trial; talk with your doctor about all treatment options. The side effects of breast cancer treatment can be reduced or managed with a variety of medications and the help of your health care team. This is called palliative care and is an important part of the overall treatment plan. How can I cope with breast cancer? Absorbing the news of a cancer diagnosis and communicating with your health care team are key parts of the coping process. Seeking support, organizing your health information, making sure all of your questions are answered, and participating in the decision-making process are other steps. Talk with your health care team about any concerns. Understanding your emotions and those of people close to you can be helpful in managing the diagnosis, treatment, and healing process. [address]. © 2004 AMERICAN SOCIETY OF CLINICAL ONCOLOGY. MADE AVAILABLE THROUGH   Questions to ask the health care team   Regular communication is important in making informed decisions about your health care.  Consider asking the following questions of your health care team:   What type of breast cancer do I have? Can you explain my pathology report (laboratory test results) to me? What stage is the breast cancer? What does this mean? What is the ER/NE status of the tumor? The HER2 status? What does this                       mean? Would you explain my treatment options? What clinical trials are available for me? Where are they located, and how do I                 find out more about them? What treatment plan do you recommend? Why? Should treatment before surgery be considered? What is the goal of each treatment? Is it to eliminate the cancer, help me feel                   better, or both? Who will be part of my treatment team, and what does each member do? How will this treatment affect my daily life? Will I be able to work, exercise, and                perform my usual activities? Will this treatment affect my ability to become pregnant or have children? What                can be done to preserve my fertility? What long-term side effects are associated with my cancer treatment? If Im worried about managing the costs of cancer care, who can help me? Where can I find emotional support for me and my family? Whom should I call with questions or problems? Is there anything else I should be asking? Find more questions to ask the health care team at [address] net/breast and www.cancer. net/metastaticbreast. For a digital list of questions, download Cancer. Nets free mobile eliza at www.cancer. net/eliza. The ideas and opinions expressed here do not necessarily reflect the opinions of the American Society of Clinical Oncology (ASCO) or [employer]. The information in this fact sheet is not intended as medical or legal advice, or as a substitute for consultation with a physician or other licensed health care provider.  Patients with health care-related questions should call or see their physician or other health care provider promptly and should not disregard professional medical advice, or delay seeking it, because of information encountered here. The mention of any product, service, or treatment in this fact sheet should not be construed as an ASCO endorsement. ASCO is not responsible for any injury or damage to persons or property arising out of or related to any use of ASCOs patient education materials, or to any errors or omissions. To order more printed copies, please call [phone] or visit www.cancer. net/estore. TERMS TO KNOW     Benign: A growth that is not cancerous     Biopsy: Removal of a small tissue sample that is examined under a microscope to check for cancer cells     Chemotherapy: The use of drugs to destroy cancer cells     DCIS: Ductal carcinoma in situ; cancer that has not spread past the ducts and is not invasive     Lymph node: A tiny, bean-shaped organ that fights infection     Lumpectomy: The surgical removal of the tumor and an area of healthy tissue around the tumor     Malignant: A cancerous growth or mass     Mastectomy: Surgical removal of the entire breast     Metastasis: The spread of cancer to another part of the body, usually to another organ     Oncologist: A doctor who specializes in treating cancer     Radiation therapy: The use of high-energy x-rays to destroy cancer cells     Tumor: An abnormal growth of body tissue     [address] [facility], [address] [place] [address], [address] [place] [address]  Toll Free: [phone]  Phone: [phone] www. [employer]. [employer]  www.conquer. org © 2017 American Society of Clinical Oncology. For permissions information, contact [email or web address].   [email or web address]       Plan:  - Your MRI looked good, the only area noted on the MRI is the breast tumor.   - Your tumor was very strongly estrogen receptor positive. This is a good thing for treatment. - We recommend upfront surgery to remove the tumor either by lumpectomy or mastectomy.  This will be discussed in detail at the surgeon office.   - If you have breast conserving surgery (lumpectomy) we will recommend radiation therapy after surgery for a few weeks. - After radiation we would recommend a pill that you will take daily for 5 years call Arimidex that blocks estrogen. Anastrozole     Select Language?   (an AS troe zole)  Trade Name: Kristy Manuel  Anastrozole is the generic name for the trade name drug Arimidex®. In some cases, health care professionals may use the trade name Kristy Manuel when referring to the generic drug name Anastrozole. Drug Type:  Anastrozole is an anti-cancer hormone therapy. This medication is classified as a \"non-steroidal aromatase inhibitor\" (For more detail see \"How Anastrozole Works\" below). What Anastrozole Is Used For:  Anastrozole is used to treat breast cancer in postmenopausal women. Note: If a drug has been approved for one use, physicians sometimes elect to use this same drug for other problems if they believe it might be helpful. How Anastrozole Is Given:  Anastrozole is a pill, taken by mouth. You should take anastrozole at about the same time each day. You may take anastrozole with or without food. If you miss a dose of anastrozole, take it as soon as you remember unless it is almost time for the next dose. Do not take a double dose to make up a missed dose. You should not stop taking anastozole without discussing with your physician, even if you feel well. The amount of anastrozole that you will receive depends on many factors, including your general health or other health problems, and the type of cancer or condition being treated. Your doctor will determine your dose and how long you will be taking anastrozole. Side Effects:  Important things to remember about the side effects of anastrozole:  Most people do not experience all of the anastrozole side effects listed. Anastrozole side effects are often predictable in terms of their onset, duration, and severity. Anastrozole side effects will improve after therapy is complete. Anastrozole side effects may be quite manageable: There are many options to help minimize or prevent the side effects of anastrozole. The following side effects are common (occurring in greater than 30%) for patients taking Anastrozole: Hot Flashes   Muscle/Joint pain   Stomach upset  The following side effects are less common (occurring in 10-29%) for patients taking anastrozole:  Decreased energy   Mood disturbances   Sore throat   High blood pressure   Depression   Nausea   Vomiting   Rash   Osteoporosis (Weak bones)   Fractures   Back pain   Insomnia (Trouble sleeping)   Headache   Peripheral edema and lymphedema (fluid build-up)   Dyspnea (difficulty breathing)   Increased cough  Not all side effects are listed above, some that are rare (occurring in less than 10% of patients) are not listed here. However, you should always inform your health care provider if you experience any unusual symptoms. When To Contact Your Doctor or Health Care Provider:  Contact your health care provider immediately, day or night and go to the nearest emergency room, if you should experience any of the following symptoms:  New or worsening chest pain   Shortness of breath   Swelling of the face, lips, tongue and/or throat   Difficulty swallowing and/or breathing. The following symptoms require medical attention, but are not emergency situations. Contact your health care provider within 24 hours of noticing any of the following:  Vaginal bleeding (similar to a period)   Tickling, Tingling, or Numbness of your skin   Nausea (interfering with ability to eat and unrelieved with prescribed medication)   Vomiting (vomiting more than 4-5 times in a 24 hour period)   Yellowing of your skin or the whites of your eyes   Pain on the right side of your stomach-area  Always inform your health care provider if you experience any unusual symptoms.   Precautions:  Before starting anastrozole treatment, make sure you tell your doctor about any other medications you are taking (including over-the-counter drugs, vitamins, or herbal remedies)   Anastrozole interacts with certain medications. Make sure you tell your doctor if you are taking these medications:   Tamozifen   Estrogen   Warfarin   Estrogen/estradiol-containing medications (commonly used for menopause and birth-control)  Before starting anastrozole treatment, make sure you tell your doctor about health conditions you have including: heart conditions, osteoporosis, and abnormal cholesterol. Anastrozole may cause increased risk for heart disease. Talk with your healthcare provider to weigh the benefit and risks. Inform your health care professional if you have not had menopause yet (premenopausal). Inform your health care professional if you are pregnant or may be pregnant prior to starting this treatment. Anastrozole is pregnancy category X (anastrozole may be hazardous to the fetus. Anastrozole is contraindicted in women who are pregnant or may become pregnant). Anastrozole may enter breast milk. It is unclear what effect this may have on babies. Tell your doctor if you are breastfeeding or plan to breastfeed. Self Care Tips:  If you experience hot flashes, wearing light clothing, staying in a cool environment, and putting cool cloths on your head may reduce symptoms. Consult your health care provider if these worsen, or become intolerable. Acetaminophen or ibuprofen may help relieve discomfort from generalized aches and pains. However, be sure to talk with your doctor before taking it. Anastrozole causes little nausea. However, to reduce nausea, take anti-nausea medications as prescribed by your doctor before taking it.    Good health practices such as getting plenty of rest and eating a healthy diet along with regular exercise are recommended   If you experience symptoms or side effects, be sure to discuss them with your health care team. They can prescribe medications and/or offer other suggestions that are effective in managing such problems. Monitoring and Testing While Taking Anastrozole: You will be checked regularly by your doctor while you are taking anastrozole, to monitor side effects and check your response to therapy. No additional blood work or tests are required for anastrozole. How Anastrozole Works:  Hormones are chemical substances that are produced by glands in the body, which enter the bloodstream and can cause effects in other parts of the body. For example, the hormone testosterone is made in the testicles and is responsible for male characteristics such as deepening voice and increased body hair. The use of hormone therapy to treat cancer is based on the observation that cancer cell growth can partially depend on hormone binding to receptors on the cancer cell surface. Hormone therapies can work through methods such as stopping the production of a certain hormone or interfering with hormone binding to the cancer cell receptor. The different types of hormone therapies are categorized by their function and/or the type of hormone that is affected. Anastrozole is an aromatase inhibitor. This means it blocks the enzyme aromatase (found in the body's muscle, skin, breast and fat), which is used to convert androgens (hormones produced by the adrenal glands) into estrogens. Tumor cells dependent on estrogens grow less when there is no estrogen. Note: We strongly encourage you to talk with your health care professional about your specific medical condition and treatments. The information contained inthis website is meant to be helpful and educational, but is not a substitute for medical advice. Anastrozole. Veronica [May 13th, 2016]. [employer].  Germaine Millan, [address] [facility] June 29th, 2016        Follow Up:  - 6-8 weeks      Treatment Summary has been discussed and given to patient: n/a        -------------------------------------------------------------------------------------------------------------------  Please call our office at [phone] if you have any  of the following symptoms:   Fever of 100.5 or greater  Chills  Shortness of breath  Swelling or pain in one leg    After office hours an answering service is available and will contact a provider for emergencies or if you are experiencing any of the above symptoms. Patient has My Chart. My Chart log in information explained on the after visit summary printout at the [facility] [address] desk.     Avril Earl RN

## 2022-08-04 ENCOUNTER — OFFICE VISIT (OUTPATIENT)
Dept: SURGERY | Age: 57
End: 2022-08-04
Payer: COMMERCIAL

## 2022-08-04 VITALS
BODY MASS INDEX: 25.91 KG/M2 | OXYGEN SATURATION: 99 % | WEIGHT: 171 LBS | HEIGHT: 68 IN | SYSTOLIC BLOOD PRESSURE: 122 MMHG | HEART RATE: 67 BPM | DIASTOLIC BLOOD PRESSURE: 74 MMHG

## 2022-08-04 DIAGNOSIS — C50.911 INFILTRATING DUCTAL CARCINOMA OF BREAST, RIGHT (HCC): Primary | ICD-10-CM

## 2022-08-04 PROCEDURE — 99204 OFFICE O/P NEW MOD 45 MIN: CPT | Performed by: STUDENT IN AN ORGANIZED HEALTH CARE EDUCATION/TRAINING PROGRAM

## 2022-08-04 NOTE — PROGRESS NOTES
General Surgery New Patient Office Note:    8/4/2022    Fei Ervin  MRN: [id number]      CHIEF COMPLAINT: Right breast infiltrating ductal carcinoma      PRIMARY CARE PHYSICIAN: BERTRAND Barone, PA      HISTORY: Pt presents with a right breast mass. Patient was found to have a right breast mass on screening mammogram.  She denies any family history. Denies any skin changes or nipple discharge. She denies any previous pregnancies. She denies any previous hormone therapy      REVIEW OF SYSTEMS: 10 Point ROS negative except what is in HPI      Past Medical History:   Diagnosis Date    Anxiety     Depression     Osteoporosis     Pneumonia     Sleep disorder     Stomach ulcer     UTI (urinary tract infection)        Current Outpatient Medications   Medication Sig Dispense Refill    citalopram (CELEXA) 40 MG tablet Take 1 tablet by mouth daily 90 tablet 1    buPROPion (WELLBUTRIN XL) 300 MG extended release tablet Take 1 tablet by mouth once daily 90 tablet 1    acyclovir (ZOVIRAX) 400 MG tablet Take 400 mg by mouth 3 times daily      busPIRone (BUSPAR) 10 MG tablet Take 10 mg by mouth 2 times daily      fluticasone (FLONASE) 50 MCG/ACT nasal spray 2 sprays by Nasal route daily PRN      zolpidem (AMBIEN) 10 MG tablet Take 10 mg by mouth. No current facility-administered medications for this visit.        Family History   Problem Relation Age of Onset    COPD Mother     Heart Disease Mother     Hypertension Mother     Cancer Maternal Grandfather         skin ca    Cancer Father         colon ca       Social History     Socioeconomic History    Marital status: Single     Spouse name: None    Number of children: None    Years of education: None    Highest education level: None   Tobacco Use    Smoking status: Former     Packs/day: 0.50     Types: Cigarettes     Quit date: 1/1/2012     Years since quitting: 10.5    Smokeless tobacco: Never   Substance and Sexual Activity    Alcohol use: Not Currently     Social Determinants of Health     Financial Resource Strain: Low Risk     Difficulty of Paying Living Expenses: Not very hard   Food Insecurity: No Food Insecurity    Worried About Running Out of Food in the Last Year: Never true    Ran Out of Food in the Last Year: Never true   Transportation Needs: No Transportation Needs    Lack of Transportation (Medical): No    Lack of Transportation (Non-Medical): No   Physical Activity: Insufficiently Active    Days of Exercise per Week: 3 days    Minutes of Exercise per Session: 30 min   Stress: No Stress Concern Present    Feeling of Stress : Not at all   Social Connections: Unknown    Frequency of Communication with Friends and Family: More than three times a week    Frequency of Social Gatherings with Friends and Family: More than three times a week    Marital Status: Living with partner   Intimate Partner Violence: Not At Risk    Fear of Current or Ex-Partner: No    Emotionally Abused: No    Physically Abused: No    Sexually Abused: No   Housing Stability: Unknown    Unable to Pay for Housing in the Last Year: No    Unstable Housing in the Last Year: No         PHYSICAL EXAMINATION:    /74   Pulse 67   Ht 5' 8\" (1.727 m)   Wt 171 lb (77.6 kg)   SpO2 99%   BMI 26.00 kg/m²     General Appearance AOx3, in no acute distress  Eyes Conjunctivae/corneas clear. PERRL, EOM's intact. No scleral icterus  Ears, Nose, Throat ENT exam normal, no neck nodes or sinus tenderness  Neck supple, no significant adenopathy. No notable JVD  Breast: Palpable left breast mass in the 9:00 region, no palpable masses in the right breast or lymphadenopathy  Respiratory No chest wall deformities or tenderness, respiratory effort normal, no use of accessory muscles. Cardiovascular RRR. No chest wall tenderness. Gastrointestinal Abdomen soft, nontender, nondistended. BS x4. No rebound, guarding, or rigidity present. No palpable masses.  No CVA tenderness  Lymphatics No palpable lymphadenopathy, no hepatosplenomegaly  Musculoskeletal No joint tenderness, deformity or swelling. Full ROM UE/LE. Distal pulses intact UE/LE. No edema, cyanosis, or venous stasis changes. Skin Normal coloration and turgor, no rashes, no suspicious skin lesions noted  Neurological alert, oriented, normal speech, no focal findings or movement disorder noted, CN II-XII intact  Psychiatric Alert and oriented, appropriate affect      STUDIES: MRI Result (most recent):  MRI BREAST BILATERAL W WO CONTRAST 07/27/2022    Narrative  MRI of the Breasts with and without contrast    CLINICAL INDICATION:  Recent diagnosis of right breast IDC with lobular features  at 10:00 position right breast.    COMPARISON: Mammogram 07/06/2022, mammograms and ultrasound 07/15/2022. Breast  biopsy and postbiopsy images 07/19/2022. TECHNIQUE: Standard MRI sequences were obtained through the breasts in multiple  planes. Images were obtained before and after intravenous infusion of 16 mL of  Prohance contrast. Images were reviewed with PACS and with [employer] CAD software. FINDINGS: The breasts demonstrate moderate glandularity and minimal background  enhancement. There is an enhancing mass with irregular margins at the 10:00 position right  breast corresponding to the site of biopsy on recent imaging. This measures  approximately 1.7 x 1.5 x 1.7 cm in AP, transverse and craniocaudal dimensions,  respectively. There is a focus of susceptibility artifact within the mass  compatible with clip placement. No additional suspicious abnormality was  identified within either breast.    There is no evidence of axillary or internal mammary lymphadenopathy. Elsewhere, limited visualization of the partially included thorax and upper  abdomen shows no acute abnormality. Impression  1.7 cm right breast mass at 10:00 compatible with the patient's  known neoplasm. No additional suspicious abnormality was identified. Clinical  management is recommended.     BI-RADS Assessment Category 6: Known Biopsy Proven Malignancy       Mammogram Result (most recent): MAMMOGRAM POST BX CLIP PLACEMENT RIGHT 07/19/2022    Addendum 7/22/2022  7:56 PM  Addendum:    ADDENDUM, 7/19/2022:    Pathology was noted as A: Right breast, 10:00 position, 8 cm from nipple, core  biopsy: Infiltrating ductal carcinoma with lobular features, low grade (well  differentiated). Definite in situ component and lymphovascular invasion are not  identified. Results concordant with imaging. Pathology report was faxed to Dr. Michael Ulrich office on 7/20/2022 by RT Carla(CHADD)(M). Patient was referred to  Oncology services on 7/21/2022. Narrative  RIGHT BREAST CORE NEEDLE BIOPSY AND MARKER CLIP PLACEMENT WITH ULTRASOUND  GUIDANCE,  RIGHT DIAGNOSTIC DIGITAL MAMMOGRAPHY:    CLINICAL HISTORY:  Abnormal new baseline mammogram and ultrasound for histologic  diagnosis. BIOPSY AND CLIP PLACEMENT:  Written informed consent was obtained. Preliminary  ultrasound evaluation of the right axilla demonstrated no pathologically  enlarged or dysmorphic nodes. Using standard aseptic technique and 1% Xylocaine  local anesthesia, a 14-gauge Achieve core biopsy needle was used to obtain a  total of 4 samples of the 1.6 cm irregular, heterogeneous mass with variable  acoustical shadowing associated with mammographic architectural distortion at  the 10:00 position 8 cm from the nipple confirmed at mammography and ultrasound  on July 12, 2022. The samples were placed in formalin for histologic  evaluation, and a titanium marker clip was placed through the cannula. POST-BIOPSY RIGHT MAMMOGRAM:  Craniocaudal and straight lateral views  demonstrate the biopsy marker clip in expected position. The patient tolerated  the procedure well without immediate complication and left the department in  good condition. Impression  1.   Successful core needle biopsy and marker clip placement for the 1.6 cm  irregular mass at the 10:00 position. 2.  If histology fails to confirm malignancy, then followup surgical  consultation would be indicated for ultrasound wire localization and excisional  biopsy of this mass with very worrisome imaging characteristics. Thank you for referral of this patient. IMPRESSION:  Right infiltrating ductal carcinoma, ER positive, OK positive, HER2 negative    PLAN:  Discussed pathology in detail with pt. ER/OK/[id number] status also reviewed and we discussed how this may modify her pre and post-surgical management including treatment with neoadjuvant chemo, adjuvant chemo, anti-estrogen agents or the need to stop any hormonal supplementation she may be taking. I did discuss with her that she would need endocrine therapy due to her ER and OK status     Discussed family history as it may relate to any value to investigation of a genetic basis for her cancer. Discussed management options. Initial treatment should be surgical. Reviewed total vs. Partial mastectomy (w/ XRT) including different local recurrence rates but equivalent long term survival rates. Discussed potential indications for post-mastectomy XRT as well. I did discuss with her that if she went with breast conservative therapy she would need radiation. She is contemplating bilateral mastectomies with reconstruction. I will refer her to plastic surgery to get more information in this regard. Ultimately she will need a right sentinel lymph node biopsy    We have discussed the technical details of the procedure(s) in detail. Risks reviewed include anesthetic risks, bleeding, infection, wound healing problems and cosmetic abnormalities, need for further surgical intervention depending on pathology reports, lymphedema if axillary procedure planned, and recurrence. All questions are answered.

## 2022-08-08 DIAGNOSIS — C50.911 INFILTRATING DUCTAL CARCINOMA OF BREAST, RIGHT (HCC): Primary | ICD-10-CM

## 2022-08-16 ENCOUNTER — PATIENT MESSAGE (OUTPATIENT)
Dept: INTERNAL MEDICINE CLINIC | Facility: CLINIC | Age: 57
End: 2022-08-16

## 2022-08-16 DIAGNOSIS — G47.00 INSOMNIA, UNSPECIFIED TYPE: Primary | ICD-10-CM

## 2022-08-17 NOTE — TELEPHONE ENCOUNTER
From: Manisha Spencer  To: Rainer Lundberg  Sent: 8/16/2022 7:48 PM EDT  Subject: Ambien 10mg    Would you please call me a prescription refill. for my Ambien.   Thank you so much

## 2022-08-18 ENCOUNTER — APPOINTMENT (OUTPATIENT)
Dept: MAMMOGRAPHY | Age: 57
End: 2022-08-18
Payer: COMMERCIAL

## 2022-08-18 ENCOUNTER — HOSPITAL ENCOUNTER (OUTPATIENT)
Dept: MAMMOGRAPHY | Age: 57
Discharge: HOME | End: 2022-08-21
Payer: COMMERCIAL

## 2022-08-18 DIAGNOSIS — C50.911 INFILTRATING DUCTAL CARCINOMA OF BREAST, RIGHT (HCC): ICD-10-CM

## 2022-08-18 DIAGNOSIS — R92.8 ABNORMAL FINDING ON MAMMOGRAPHY: ICD-10-CM

## 2022-08-18 DIAGNOSIS — G47.00 INSOMNIA, UNSPECIFIED TYPE: ICD-10-CM

## 2022-08-18 PROCEDURE — 77065 DX MAMMO INCL CAD UNI: CPT

## 2022-08-18 PROCEDURE — 19285 PERQ DEV BREAST 1ST US IMAG: CPT

## 2022-08-18 PROCEDURE — 2500000003 HC RX 250 WO HCPCS: Performed by: STUDENT IN AN ORGANIZED HEALTH CARE EDUCATION/TRAINING PROGRAM

## 2022-08-18 RX ORDER — ZOLPIDEM TARTRATE 10 MG/1
10 TABLET ORAL NIGHTLY PRN
Qty: 30 TABLET | Refills: 3 | OUTPATIENT
Start: 2022-08-18 | End: 2022-09-17

## 2022-08-18 RX ORDER — LIDOCAINE HYDROCHLORIDE 10 MG/ML
5 INJECTION, SOLUTION INFILTRATION; PERINEURAL ONCE
Status: COMPLETED | OUTPATIENT
Start: 2022-08-18 | End: 2022-08-18

## 2022-08-18 RX ORDER — ZOLPIDEM TARTRATE 10 MG/1
10 TABLET ORAL NIGHTLY PRN
Qty: 30 TABLET | Refills: 3 | Status: SHIPPED | OUTPATIENT
Start: 2022-08-18 | End: 2022-09-17

## 2022-08-18 RX ADMIN — LIDOCAINE HYDROCHLORIDE 5 ML: 10 INJECTION, SOLUTION INFILTRATION; PERINEURAL at 10:25

## 2022-08-18 ASSESSMENT — PAIN SCALES - GENERAL: PAINLEVEL_OUTOF10: 3

## 2022-08-29 NOTE — PERIOP NOTE
Patient verified name and . Order for consent NOT found in EHR; patient verifies procedure. Type 1B surgery, phone assessment complete. Orders not received. Labs per surgeon: no orders received. Labs per anesthesia protocol: none. Patient answered medical/surgical history questions at their best of ability. All prior to admission medications documented in [facility]. Patient instructed to take the following medications the day of surgery according to anesthesia guidelines with a small sip of water: Buspar, Wellbutrin, Celexa, Acyclovir, Flonase PRN. On the day before surgery please take Acetaminophen 1000mg in the morning and then again before bed. You may substitute for Tylenol 650 mg. Hold all vitamins 7 days prior to surgery and NSAIDS 5 days prior to surgery. Patient instructed on the following:    > Arrive at [address], time of arrival to be called the day before by 1700  > NPO after midnight, unless otherwise indicated, including gum, mints, and ice chips  > Responsible adult must drive patient to the hospital, stay during surgery, and patient will need supervision 24 hours after anesthesia  > Use anti-bacterial soap in shower the night before surgery and on the morning of surgery  > All piercings must be removed prior to arrival.    > Leave all valuables (money and jewelry) at home but bring insurance card and ID on DOS.   > You may be required to pay a deductible or co-pay on the day of your procedure. You can pre-pay by calling [phone] if your surgery is at the [facility] or [phone] if your surgery is at the [facility]. > Do not wear deodorant, make-up, nail polish, lotions, cologne, perfumes, powders, or oil on skin. Artificial nails are not permitted.

## 2022-08-31 ENCOUNTER — HOSPITAL ENCOUNTER (OUTPATIENT)
Dept: NUCLEAR MEDICINE | Age: 57
Discharge: HOME | End: 2022-09-03
Payer: COMMERCIAL

## 2022-08-31 ENCOUNTER — HOSPITAL ENCOUNTER (OUTPATIENT)
Age: 57
Setting detail: OUTPATIENT SURGERY
Discharge: HOME | End: 2022-08-31
Attending: STUDENT IN AN ORGANIZED HEALTH CARE EDUCATION/TRAINING PROGRAM | Admitting: STUDENT IN AN ORGANIZED HEALTH CARE EDUCATION/TRAINING PROGRAM
Payer: COMMERCIAL

## 2022-08-31 ENCOUNTER — ANESTHESIA EVENT (OUTPATIENT)
Dept: SURGERY | Age: 57
End: 2022-08-31
Payer: COMMERCIAL

## 2022-08-31 ENCOUNTER — ANESTHESIA (OUTPATIENT)
Dept: SURGERY | Age: 57
End: 2022-08-31
Payer: COMMERCIAL

## 2022-08-31 ENCOUNTER — HOSPITAL ENCOUNTER (OUTPATIENT)
Dept: MAMMOGRAPHY | Age: 57
Discharge: HOME | End: 2022-09-03
Payer: COMMERCIAL

## 2022-08-31 VITALS
WEIGHT: 174 LBS | RESPIRATION RATE: 16 BRPM | SYSTOLIC BLOOD PRESSURE: 129 MMHG | HEART RATE: 73 BPM | DIASTOLIC BLOOD PRESSURE: 64 MMHG | BODY MASS INDEX: 26.37 KG/M2 | TEMPERATURE: 97.9 F | OXYGEN SATURATION: 99 % | HEIGHT: 68 IN

## 2022-08-31 DIAGNOSIS — C50.911 INFILTRATING DUCTAL CARCINOMA OF BREAST, RIGHT (HCC): ICD-10-CM

## 2022-08-31 DIAGNOSIS — C50.911 INFILTRATING DUCTAL CARCINOMA OF BREAST, RIGHT (HCC): Primary | ICD-10-CM

## 2022-08-31 DIAGNOSIS — C50.919 INFILTRATING DUCTAL CARCINOMA (HCC): ICD-10-CM

## 2022-08-31 PROCEDURE — A4216 STERILE WATER/SALINE, 10 ML: HCPCS | Performed by: STUDENT IN AN ORGANIZED HEALTH CARE EDUCATION/TRAINING PROGRAM

## 2022-08-31 PROCEDURE — 7100000010 HC PHASE II RECOVERY - FIRST 15 MIN: Performed by: STUDENT IN AN ORGANIZED HEALTH CARE EDUCATION/TRAINING PROGRAM

## 2022-08-31 PROCEDURE — 6360000002 HC RX W HCPCS

## 2022-08-31 PROCEDURE — 2580000003 HC RX 258: Performed by: ANESTHESIOLOGY

## 2022-08-31 PROCEDURE — 7100000001 HC PACU RECOVERY - ADDTL 15 MIN: Performed by: STUDENT IN AN ORGANIZED HEALTH CARE EDUCATION/TRAINING PROGRAM

## 2022-08-31 PROCEDURE — 3700000001 HC ADD 15 MINUTES (ANESTHESIA): Performed by: STUDENT IN AN ORGANIZED HEALTH CARE EDUCATION/TRAINING PROGRAM

## 2022-08-31 PROCEDURE — 2709999900 HC NON-CHARGEABLE SUPPLY: Performed by: STUDENT IN AN ORGANIZED HEALTH CARE EDUCATION/TRAINING PROGRAM

## 2022-08-31 PROCEDURE — 7100000011 HC PHASE II RECOVERY - ADDTL 15 MIN: Performed by: STUDENT IN AN ORGANIZED HEALTH CARE EDUCATION/TRAINING PROGRAM

## 2022-08-31 PROCEDURE — 78195 LYMPH SYSTEM IMAGING: CPT

## 2022-08-31 PROCEDURE — 2500000003 HC RX 250 WO HCPCS

## 2022-08-31 PROCEDURE — 38525 BIOPSY/REMOVAL LYMPH NODES: CPT | Performed by: STUDENT IN AN ORGANIZED HEALTH CARE EDUCATION/TRAINING PROGRAM

## 2022-08-31 PROCEDURE — 3430000000 HC RX DIAGNOSTIC RADIOPHARMACEUTICAL: Performed by: STUDENT IN AN ORGANIZED HEALTH CARE EDUCATION/TRAINING PROGRAM

## 2022-08-31 PROCEDURE — 7100000000 HC PACU RECOVERY - FIRST 15 MIN: Performed by: STUDENT IN AN ORGANIZED HEALTH CARE EDUCATION/TRAINING PROGRAM

## 2022-08-31 PROCEDURE — 6370000000 HC RX 637 (ALT 250 FOR IP): Performed by: ANESTHESIOLOGY

## 2022-08-31 PROCEDURE — 6360000002 HC RX W HCPCS: Performed by: STUDENT IN AN ORGANIZED HEALTH CARE EDUCATION/TRAINING PROGRAM

## 2022-08-31 PROCEDURE — 3700000000 HC ANESTHESIA ATTENDED CARE: Performed by: STUDENT IN AN ORGANIZED HEALTH CARE EDUCATION/TRAINING PROGRAM

## 2022-08-31 PROCEDURE — 2500000003 HC RX 250 WO HCPCS: Performed by: STUDENT IN AN ORGANIZED HEALTH CARE EDUCATION/TRAINING PROGRAM

## 2022-08-31 PROCEDURE — 3600000014 HC SURGERY LEVEL 4 ADDTL 15MIN: Performed by: STUDENT IN AN ORGANIZED HEALTH CARE EDUCATION/TRAINING PROGRAM

## 2022-08-31 PROCEDURE — 76098 X-RAY EXAM SURGICAL SPECIMEN: CPT

## 2022-08-31 PROCEDURE — 6360000002 HC RX W HCPCS: Performed by: ANESTHESIOLOGY

## 2022-08-31 PROCEDURE — 19301 PARTIAL MASTECTOMY: CPT | Performed by: STUDENT IN AN ORGANIZED HEALTH CARE EDUCATION/TRAINING PROGRAM

## 2022-08-31 PROCEDURE — 88307 TISSUE EXAM BY PATHOLOGIST: CPT

## 2022-08-31 PROCEDURE — A9541 TC99M SULFUR COLLOID: HCPCS | Performed by: STUDENT IN AN ORGANIZED HEALTH CARE EDUCATION/TRAINING PROGRAM

## 2022-08-31 PROCEDURE — 3600000004 HC SURGERY LEVEL 4 BASE: Performed by: STUDENT IN AN ORGANIZED HEALTH CARE EDUCATION/TRAINING PROGRAM

## 2022-08-31 PROCEDURE — 2580000003 HC RX 258: Performed by: STUDENT IN AN ORGANIZED HEALTH CARE EDUCATION/TRAINING PROGRAM

## 2022-08-31 RX ORDER — SODIUM CHLORIDE 9 MG/ML
INJECTION INTRAVENOUS PRN
Status: DISCONTINUED | OUTPATIENT
Start: 2022-08-31 | End: 2022-08-31 | Stop reason: ALTCHOICE

## 2022-08-31 RX ORDER — OXYCODONE HYDROCHLORIDE 5 MG/1
5 TABLET ORAL
Status: DISCONTINUED | OUTPATIENT
Start: 2022-08-31 | End: 2022-08-31 | Stop reason: HOSPADM

## 2022-08-31 RX ORDER — LIDOCAINE HYDROCHLORIDE 20 MG/ML
INJECTION, SOLUTION EPIDURAL; INFILTRATION; INTRACAUDAL; PERINEURAL PRN
Status: DISCONTINUED | OUTPATIENT
Start: 2022-08-31 | End: 2022-08-31 | Stop reason: SDUPTHER

## 2022-08-31 RX ORDER — MIDAZOLAM HYDROCHLORIDE 2 MG/2ML
2 INJECTION, SOLUTION INTRAMUSCULAR; INTRAVENOUS
Status: COMPLETED | OUTPATIENT
Start: 2022-08-31 | End: 2022-08-31

## 2022-08-31 RX ORDER — PROPOFOL 10 MG/ML
INJECTION, EMULSION INTRAVENOUS PRN
Status: DISCONTINUED | OUTPATIENT
Start: 2022-08-31 | End: 2022-08-31 | Stop reason: SDUPTHER

## 2022-08-31 RX ORDER — HYDROMORPHONE HYDROCHLORIDE 1 MG/ML
0.5 INJECTION, SOLUTION INTRAMUSCULAR; INTRAVENOUS; SUBCUTANEOUS EVERY 5 MIN PRN
Status: DISCONTINUED | OUTPATIENT
Start: 2022-08-31 | End: 2022-08-31 | Stop reason: HOSPADM

## 2022-08-31 RX ORDER — SODIUM CHLORIDE, SODIUM LACTATE, POTASSIUM CHLORIDE, CALCIUM CHLORIDE 600; 310; 30; 20 MG/100ML; MG/100ML; MG/100ML; MG/100ML
INJECTION, SOLUTION INTRAVENOUS CONTINUOUS
Status: DISCONTINUED | OUTPATIENT
Start: 2022-08-31 | End: 2022-08-31 | Stop reason: HOSPADM

## 2022-08-31 RX ORDER — LIDOCAINE HYDROCHLORIDE AND EPINEPHRINE 10; 10 MG/ML; UG/ML
INJECTION, SOLUTION INFILTRATION; PERINEURAL PRN
Status: DISCONTINUED | OUTPATIENT
Start: 2022-08-31 | End: 2022-08-31 | Stop reason: ALTCHOICE

## 2022-08-31 RX ORDER — FENTANYL CITRATE 50 UG/ML
INJECTION, SOLUTION INTRAMUSCULAR; INTRAVENOUS PRN
Status: DISCONTINUED | OUTPATIENT
Start: 2022-08-31 | End: 2022-08-31 | Stop reason: SDUPTHER

## 2022-08-31 RX ORDER — PROCHLORPERAZINE EDISYLATE 5 MG/ML
5 INJECTION INTRAMUSCULAR; INTRAVENOUS
Status: DISCONTINUED | OUTPATIENT
Start: 2022-08-31 | End: 2022-08-31 | Stop reason: HOSPADM

## 2022-08-31 RX ORDER — SODIUM CHLORIDE 0.9 % (FLUSH) 0.9 %
5-40 SYRINGE (ML) INJECTION PRN
Status: DISCONTINUED | OUTPATIENT
Start: 2022-08-31 | End: 2022-08-31 | Stop reason: HOSPADM

## 2022-08-31 RX ORDER — DOCUSATE SODIUM 100 MG/1
100 CAPSULE, LIQUID FILLED ORAL 2 TIMES DAILY
Qty: 60 CAPSULE | Refills: 0 | Status: SHIPPED | OUTPATIENT
Start: 2022-08-31 | End: 2022-09-30

## 2022-08-31 RX ORDER — HEPARIN SODIUM 5000 [USP'U]/ML
5000 INJECTION, SOLUTION INTRAVENOUS; SUBCUTANEOUS ONCE
Status: COMPLETED | OUTPATIENT
Start: 2022-08-31 | End: 2022-08-31

## 2022-08-31 RX ORDER — ONDANSETRON 2 MG/ML
INJECTION INTRAMUSCULAR; INTRAVENOUS PRN
Status: DISCONTINUED | OUTPATIENT
Start: 2022-08-31 | End: 2022-08-31 | Stop reason: SDUPTHER

## 2022-08-31 RX ORDER — LIDOCAINE HYDROCHLORIDE 10 MG/ML
1 INJECTION, SOLUTION INFILTRATION; PERINEURAL
Status: DISCONTINUED | OUTPATIENT
Start: 2022-08-31 | End: 2022-08-31 | Stop reason: HOSPADM

## 2022-08-31 RX ORDER — CLINDAMYCIN PHOSPHATE 600 MG/50ML
600 INJECTION INTRAVENOUS ONCE
Status: COMPLETED | OUTPATIENT
Start: 2022-08-31 | End: 2022-08-31

## 2022-08-31 RX ORDER — DEXAMETHASONE SODIUM PHOSPHATE 10 MG/ML
INJECTION INTRAMUSCULAR; INTRAVENOUS PRN
Status: DISCONTINUED | OUTPATIENT
Start: 2022-08-31 | End: 2022-08-31 | Stop reason: SDUPTHER

## 2022-08-31 RX ORDER — MEPERIDINE HYDROCHLORIDE 50 MG/ML
12.5 INJECTION INTRAMUSCULAR; INTRAVENOUS; SUBCUTANEOUS EVERY 5 MIN PRN
Status: DISCONTINUED | OUTPATIENT
Start: 2022-08-31 | End: 2022-08-31 | Stop reason: HOSPADM

## 2022-08-31 RX ORDER — SODIUM CHLORIDE 0.9 % (FLUSH) 0.9 %
5-40 SYRINGE (ML) INJECTION EVERY 12 HOURS SCHEDULED
Status: DISCONTINUED | OUTPATIENT
Start: 2022-08-31 | End: 2022-08-31 | Stop reason: HOSPADM

## 2022-08-31 RX ORDER — ONDANSETRON 2 MG/ML
4 INJECTION INTRAMUSCULAR; INTRAVENOUS
Status: DISCONTINUED | OUTPATIENT
Start: 2022-08-31 | End: 2022-08-31 | Stop reason: HOSPADM

## 2022-08-31 RX ORDER — ISOSULFAN BLUE 50 MG/5ML
INJECTION, SOLUTION SUBCUTANEOUS PRN
Status: DISCONTINUED | OUTPATIENT
Start: 2022-08-31 | End: 2022-08-31 | Stop reason: ALTCHOICE

## 2022-08-31 RX ORDER — BUPIVACAINE HYDROCHLORIDE 5 MG/ML
INJECTION, SOLUTION PERINEURAL PRN
Status: DISCONTINUED | OUTPATIENT
Start: 2022-08-31 | End: 2022-08-31 | Stop reason: ALTCHOICE

## 2022-08-31 RX ORDER — ACETAMINOPHEN 500 MG
1000 TABLET ORAL ONCE
Status: COMPLETED | OUTPATIENT
Start: 2022-08-31 | End: 2022-08-31

## 2022-08-31 RX ORDER — SODIUM CHLORIDE 9 MG/ML
INJECTION, SOLUTION INTRAVENOUS PRN
Status: DISCONTINUED | OUTPATIENT
Start: 2022-08-31 | End: 2022-08-31 | Stop reason: HOSPADM

## 2022-08-31 RX ORDER — EPHEDRINE SULFATE/0.9% NACL/PF 50 MG/5 ML
SYRINGE (ML) INTRAVENOUS PRN
Status: DISCONTINUED | OUTPATIENT
Start: 2022-08-31 | End: 2022-08-31 | Stop reason: SDUPTHER

## 2022-08-31 RX ORDER — TRAMADOL HYDROCHLORIDE 50 MG/1
50 TABLET ORAL EVERY 4 HOURS PRN
Qty: 18 TABLET | Refills: 0 | Status: SHIPPED | OUTPATIENT
Start: 2022-08-31 | End: 2022-09-03

## 2022-08-31 RX ADMIN — ONDANSETRON 4 MG: 2 INJECTION INTRAMUSCULAR; INTRAVENOUS at 12:35

## 2022-08-31 RX ADMIN — HEPARIN SODIUM 5000 UNITS: 5000 INJECTION INTRAVENOUS; SUBCUTANEOUS at 10:26

## 2022-08-31 RX ADMIN — SODIUM CHLORIDE, SODIUM LACTATE, POTASSIUM CHLORIDE, AND CALCIUM CHLORIDE: 600; 310; 30; 20 INJECTION, SOLUTION INTRAVENOUS at 13:45

## 2022-08-31 RX ADMIN — LIDOCAINE HYDROCHLORIDE 80 MG: 20 INJECTION, SOLUTION EPIDURAL; INFILTRATION; INTRACAUDAL; PERINEURAL at 12:35

## 2022-08-31 RX ADMIN — MIDAZOLAM 2 MG: 1 INJECTION INTRAMUSCULAR; INTRAVENOUS at 10:43

## 2022-08-31 RX ADMIN — DEXAMETHASONE SODIUM PHOSPHATE 5 MG: 10 INJECTION INTRAMUSCULAR; INTRAVENOUS at 12:35

## 2022-08-31 RX ADMIN — CLINDAMYCIN PHOSPHATE 600 MG: 600 INJECTION, SOLUTION INTRAVENOUS at 12:33

## 2022-08-31 RX ADMIN — SODIUM CHLORIDE, SODIUM LACTATE, POTASSIUM CHLORIDE, AND CALCIUM CHLORIDE: 600; 310; 30; 20 INJECTION, SOLUTION INTRAVENOUS at 10:36

## 2022-08-31 RX ADMIN — FENTANYL CITRATE 100 MCG: 50 INJECTION, SOLUTION INTRAMUSCULAR; INTRAVENOUS at 12:28

## 2022-08-31 RX ADMIN — Medication 480 MICRO CURIE: at 09:00

## 2022-08-31 RX ADMIN — Medication 10 MG: at 12:43

## 2022-08-31 RX ADMIN — PROPOFOL 200 MG: 10 INJECTION, EMULSION INTRAVENOUS at 12:35

## 2022-08-31 RX ADMIN — ACETAMINOPHEN 1000 MG: 500 TABLET, FILM COATED ORAL at 10:26

## 2022-08-31 RX ADMIN — Medication 10 MG: at 13:04

## 2022-08-31 ASSESSMENT — LIFESTYLE VARIABLES: SMOKING_STATUS: 1

## 2022-08-31 NOTE — ANESTHESIA POSTPROCEDURE EVALUATION
Department of Anesthesiology  Postprocedure Note    Patient: Shen Goetz  MRN: [id number]  YOB: 1965  Date of evaluation: 8/31/2022      Procedure Summary     Date: 08/31/22 Room / Location: E MAIN OR 03 / E MAIN OR    Anesthesia Start: 1227 Anesthesia Stop: 9598    Procedures:       RIGHT BREAST LUMPECTOMY WITH MAG SEED 4th case (Right: Breast)      RIGHT BREAST BIOPSY SENTINEL NODE DISSECTION (Right: Breast) Diagnosis:       Malignant neoplasm of right female breast, unspecified estrogen receptor status, unspecified site of breast ([address]      (Malignant neoplasm of right female breast, unspecified estrogen receptor status, unspecified site of breast ([facility] [address] [facility])    Surgeons: Teto Arechiga DO Responsible Provider: Bong Pichardo MD    Anesthesia Type: general ASA Status: 2          Anesthesia Type: No value filed.     Brock Phase I: Brock Score: 9    Brock Phase II:        Anesthesia Post Evaluation    Patient location during evaluation: PACU  Patient participation: complete - patient participated  Level of consciousness: awake and alert  Airway patency: patent  Nausea & Vomiting: no nausea and no vomiting  Complications: no  Cardiovascular status: hemodynamically stable  Respiratory status: acceptable, nonlabored ventilation and spontaneous ventilation  Hydration status: euvolemic  Comments: BP (!) 128/59   Pulse 72   Temp 97.9 °F (36.6 °C)   Resp 16   Ht 5' 8\" (1.727 m)   Wt 174 lb (78.9 kg)   SpO2 99%   BMI 26.46 kg/m²     Multimodal analgesia pain management approach

## 2022-08-31 NOTE — DISCHARGE INSTRUCTIONS
INSTRUCTIONS FOLLOWING BREAST SURGERY    ACTIVITY   As tolerated or as directed by your doctor. DO NOT wear tight or restrictive clothing. Avoid heavy lifting or pulling (no more than 5 pounds). DIET   Clear liquids until no nausea or vomiting; then light diet for the first day. Advance to regular diet on second day, unless your doctor orders otherwise. If nausea or vomiting continue, call your doctor. PAIN   Take pain medication as directed by your doctor. Call your doctor if pain is NOT relieved by medication. DO NOT take aspirin or blood thinners unless directed by your doctor. DRESSINGS     If your wound has surgical glue as the closure. May shower and just allow the water and soap run over the site and gently pat dry. No oils, ointments, or lotions. The skin glue will flake off over the next 7-10 days. Do not pull any loose glue pieces. SHOWERS AND BATHING   You may shower in 24 hours once the anesthesia has worn off. DO NOT submerge     the incisions or drain sites in a tub bath. FOLLOW-UP PHONE CALLS   Calls will be made by nursing staff. If you have any problems, call your doctor. CALL YOUR DOCTOR IF YOU HAVE:   Excessive bleeding that does not stop after holding mild pressure over the area   Temperature of 101°F or above   Redness, excessive swelling or bruising, uneven size or hardness of the breast,     and/or green or yellow, foul-smelling discharge from incision. Excessive leakage around drain site    AFTER ANESTHESIA   For the first 24 hours: DO NOT drive, drink alcoholic beverages, or make important       decisions. Be aware of dizziness following anesthesia and while taking pain medication.

## 2022-08-31 NOTE — OP NOTE
Seed Localization Partial Mastectomy Procedure Note      Name:  Getachew Petty Date/Time of Admission: 2022  8:23 AM  CSN: [id number]  Attending Provider: Chris Garcia *  MRN:  [id number]  : 1965 62 y.o. Pre-operative Diagnosis: R breast IDC    Post-operative Diagnosis: Same    Procedure:   1. Right Breast Seed Localization Partial Mastectomy  2. Right [place] Lymph Node Biopsy    Surgeon: Chris Garcia, *    Anesthesia: General endotracheal anesthesia    INDICATION: The patient is a 62y.o.-year-old female with known R IDC. They are here for seed localized excision of the mass. Patient is correctly identified in preoperative holding area. Consent was confirmed and placed on the chart. Preoperative antibiotics were administered per SCIP protocol. The patient was then taken back to the operative suite and placed in the supine position. General anesthesia was performed per anesthesia via an endotracheal tube. I injected the right breast with blue dye and massaged for 5 minutes. The patient's breast was then prepped and draped in the usual sterile fashion. A timeout was performed and all members of the team that were present were in agreement. Attention was turned to the axilla. Using the gamma probe an area of radiotracer uptake was identified. Next the skin overlying this area was locally anesthetized. Using a 15 blade scalpel incision was made through skin and subcutaneous tissues below next Bovie electrocautery was used to further dissection into the axilla. Next a focused dissection was performed with the gamma probe to identify the sentinel lymph nodes. Using combination of blunt and sharp dissection with a right angle as well as Bovie electrocautery, sentinel lymph nodes were dissected free and evaluated individually. [place] lymph node No. 1 was resected from surrounding tissues and had an ex vivo radioactivity count of 950 and was blue in color.      Through

## 2022-08-31 NOTE — ANESTHESIA PRE PROCEDURE
Department of Anesthesiology  Preprocedure Note       Name:  Chip Hankins   Age:  62 y.o.  :  1965                                          MRN:  [id number]         Date:  2022      Surgeon: Angélica Morgan):  Quinn Rodriguez DO    Procedure: Procedure(s):  RIGHT BREAST LUMPECTOMY WITH MAG SEED 4th case  RIGHT BREAST BIOPSY SENTINEL NODE DISSECTION    Medications prior to admission:   Prior to Admission medications    Medication Sig Start Date End Date Taking? Authorizing Provider   traMADol (ULTRAM) 50 MG tablet Take 1 tablet by mouth every 4 hours as needed for Pain for up to 3 days. Intended supply: 3 days. Take lowest dose possible to manage pain 8/31/22 9/3/22 Yes Geena Arnold DO   docusate sodium (COLACE) 100 MG capsule Take 1 capsule by mouth 2 times daily 22 Yes Geena Aronld DO   zolpidem (AMBIEN) 10 MG tablet Take 1 tablet by mouth nightly as needed for Sleep for up to 30 days.  22  BERTRAND Schneider   citalopram (CELEXA) 40 MG tablet Take 1 tablet by mouth daily 22   BERTRAND Orantes   buPROPion (WELLBUTRIN XL) 300 MG extended release tablet Take 1 tablet by mouth once daily 22   RAHUL Lara - CNP   acyclovir (ZOVIRAX) 400 MG tablet Take 400 mg by mouth 3 times daily 22   Ar Automatic Reconciliation   busPIRone (BUSPAR) 10 MG tablet Take 10 mg by mouth 2 times daily 21   Ar Automatic Reconciliation   fluticasone (FLONASE) 50 MCG/ACT nasal spray 2 sprays by Nasal route daily PRN 22   Ar Automatic Reconciliation       Current medications:    Current Facility-Administered Medications   Medication Dose Route Frequency Provider Last Rate Last Admin    clindamycin (CLEOCIN) 600 mg in dextrose 5 % 50 mL IVPB  600 mg IntraVENous Once Quinn Rodriguez DO   Held at 22 1036    lidocaine 1 % injection 1 mL  1 mL IntraDERmal Once PRN Gigi Beltran MD        lactated ringers infusion   IntraVENous Continuous Campos Robledo  mL/hr at 08/31/22 1036 New Bag at 08/31/22 1036    sodium chloride flush 0.9 % injection 5-40 mL  5-40 mL IntraVENous 2 times per day Campos Robledo MD        sodium chloride flush 0.9 % injection 5-40 mL  5-40 mL IntraVENous PRN Campos Robledo MD        0.9 % sodium chloride infusion   IntraVENous PRN Campos Robledo MD           Allergies: Allergies   Allergen Reactions    Cephalexin Rash    Droperidol Other (See Comments)     Seizure  Seizure      Penicillins Rash    Aripiprazole Other (See Comments)     Bad reaction       Problem List:    Patient Active Problem List   Diagnosis Code    Depression F32. A    Anxiety F41.9    Gastroesophageal reflux disease without esophagitis K21.9    Swelling R60.9    Infiltrating ductal carcinoma of breast, right (HCC) C50.911       Past Medical History:        Diagnosis Date    Anxiety     Depression     Osteoporosis     Pneumonia     Sleep disorder     Stomach ulcer     UTI (urinary tract infection)        Past Surgical History:        Procedure Laterality Date    BREAST BIOPSY Right 07/19/2022    US Core Bx    HYSTERECTOMY (CERVIX STATUS UNKNOWN)  2007    ORTHOPEDIC SURGERY Left 2002    $ surgeries left hand same yr    [place] ABDOMEN SURGERY PROC UNLISTED      TOTAL KNEE ARTHROPLASTY Right 2014    US BREAST NEEDLE BIOPSY RIGHT Right 7/19/2022    US BREAST NEEDLE BIOPSY RIGHT 7/19/2022 Tanya Reddy MD [facility]    US GUIDED NEEDLE LOC OF RIGHT BREAST Right 8/18/2022    US GUIDED NEEDLE LOC OF RIGHT BREAST 8/18/2022 E [facility]Y [facility]       Social History:    Social History     Tobacco Use    Smoking status: Former     Packs/day: 0.50     Types: Cigarettes     Quit date: 1/1/2012     Years since quitting: 10.6    Smokeless tobacco: Never   Substance Use Topics    Alcohol use: Not Currently                                Counseling given: Not Answered      Vital Signs (Current):   Vitals:    08/29/22 7419 08/31/22 0830   BP:  135/75   Pulse:  56   Resp:  18   Temp:  97.7 °F (36.5 °C)   TempSrc:  Temporal   SpO2:  100%   Weight: 170 lb (77.1 kg) 174 lb (78.9 kg)   Height: 5' 8\" (1.727 m)                                               BP Readings from Last 3 Encounters:   08/31/22 135/75   08/04/22 122/74   07/29/22 126/81       NPO Status: Time of last liquid consumption: 2100                        Time of last solid consumption: 2100                        Date of last liquid consumption: 08/30/22                        Date of last solid food consumption: 08/30/22    BMI:   Wt Readings from Last 3 Encounters:   08/31/22 174 lb (78.9 kg)   08/04/22 171 lb (77.6 kg)   07/29/22 171 lb 4.8 oz (77.7 kg)     Body mass index is 26.46 kg/m². CBC:   Lab Results   Component Value Date/Time    WBC 3.9 04/11/2022 02:19 PM    RBC 4.22 04/11/2022 02:19 PM    HGB 13.6 04/11/2022 02:19 PM    HCT 41.0 04/11/2022 02:19 PM    MCV 97 04/11/2022 02:19 PM    RDW 12.6 04/11/2022 02:19 PM     04/11/2022 02:19 PM       CMP:   Lab Results   Component Value Date/Time     04/11/2022 02:19 PM    K 4.4 04/11/2022 02:19 PM     04/11/2022 02:19 PM    CO2 19 04/11/2022 02:19 PM    BUN 15 04/11/2022 02:19 PM    CREATININE 1.00 04/11/2022 02:19 PM    GFRAA 73 03/11/2021 07:55 AM    AGRATIO 2.3 04/11/2022 02:19 PM    GLUCOSE 96 04/11/2022 02:19 PM    PROT 7.0 04/11/2022 02:19 PM    CALCIUM 9.8 04/11/2022 02:19 PM    BILITOT 0.4 04/11/2022 02:19 PM    ALKPHOS 67 04/11/2022 02:19 PM    AST 20 04/11/2022 02:19 PM    ALT 20 04/11/2022 02:19 PM       POC Tests: No results for input(s): POCGLU, POCNA, POCK, POCCL, POCBUN, POCHEMO, POCHCT in the last 72 hours.     Coags: No results found for: PROTIME, INR, APTT    HCG (If Applicable): No results found for: PREGTESTUR, PREGSERUM, HCG, HCGQUANT     ABGs: No results found for: PHART, PO2ART, FLS2ZFV, UEK4ZGC, BEART, Y1WORVBU     Type & Screen (If Applicable):  No results found for: LABABO, [address]    Drug/Infectious Status (If Applicable):  No results found for: HIV, HEPCAB    COVID-19 Screening (If Applicable):   Lab Results   Component Value Date/Time    COVID19 Not Detected 03/03/2022 01:34 PM    COVID19 Performed 03/03/2022 01:34 PM           Anesthesia Evaluation  Patient summary reviewed  Airway: Mallampati: I  TM distance: >3 FB   Neck ROM: full     Dental: normal exam         Pulmonary:normal exam  breath sounds clear to auscultation  (+) current smoker (Former smoker. Quit 2012.)                           Cardiovascular:Negative CV ROS  Exercise tolerance: good (>4 METS),           Rhythm: regular  Rate: normal                 ROS comment: Pt denies recent CP, SOB or changes in functional status     Neuro/Psych:   (+) depression/anxiety             GI/Hepatic/Renal:   (+) PUD,      GERD: Very rare symtpoms. Diet controlled. Endo/Other:    (+) malignancy/cancer (Breast). Abdominal:              PE comment: Deferred   Vascular: negative vascular ROS. Other Findings:           Anesthesia Plan      general     ASA 2       Induction: intravenous. Anesthetic plan and risks discussed with patient.                         Essie Rose MD   8/31/2022

## 2022-09-06 ENCOUNTER — CLINICAL DOCUMENTATION (OUTPATIENT)
Dept: CASE MANAGEMENT | Age: 57
End: 2022-09-06

## 2022-09-08 ENCOUNTER — CLINICAL DOCUMENTATION (OUTPATIENT)
Dept: CASE MANAGEMENT | Age: 57
End: 2022-09-08

## 2022-09-08 ENCOUNTER — OFFICE VISIT (OUTPATIENT)
Dept: SURGERY | Age: 57
End: 2022-09-08

## 2022-09-08 VITALS — HEIGHT: 68 IN | BODY MASS INDEX: 26.37 KG/M2 | WEIGHT: 174 LBS

## 2022-09-08 DIAGNOSIS — R07.81 RIB PAIN ON RIGHT SIDE: ICD-10-CM

## 2022-09-08 DIAGNOSIS — Z12.31 SCREENING MAMMOGRAM FOR BREAST CANCER: ICD-10-CM

## 2022-09-08 DIAGNOSIS — Z09 POSTOPERATIVE EXAMINATION: Primary | ICD-10-CM

## 2022-09-08 PROCEDURE — 99024 POSTOP FOLLOW-UP VISIT: CPT | Performed by: STUDENT IN AN ORGANIZED HEALTH CARE EDUCATION/TRAINING PROGRAM

## 2022-09-08 RX ORDER — TRAMADOL HYDROCHLORIDE 50 MG/1
50 TABLET ORAL EVERY 4 HOURS PRN
Qty: 10 TABLET | Refills: 0 | Status: SHIPPED | OUTPATIENT
Start: 2022-09-08 | End: 2022-09-11

## 2022-09-08 NOTE — PROGRESS NOTES
Connections: Unknown    Frequency of Communication with Friends and Family: More than three times a week    Frequency of Social Gatherings with Friends and Family: More than three times a week    Attends Christian Services: Not on file    Active Member of Clubs or Organizations: Not on file    Attends Club or Organization Meetings: Not on file    Marital Status: Living with partner   Intimate Partner Violence: Not At Risk    Fear of Current or Ex-Partner: No    Emotionally Abused: No    Physically Abused: No    Sexually Abused: No   Housing Stability: Unknown    Unable to Pay for Housing in the Last Year: No    Number of [place] in the Last Year: Not on file    Unstable Housing in the Last Year: No        REVIEW OF SYSTEMS: 10 Point ROS negative except what is in HPI    PHYSICAL EXAMINATION:    Ht 5' 8\" (1.727 m)   Wt 174 lb (78.9 kg)   BMI 26.46 kg/m²     General Appearance AOx3, in no acute distress  Respiratory No chest wall deformities or tenderness, respiratory effort normal, no use of accessory muscles. Cardiovascular RRR. No chest wall tenderness. Gastrointestinal Abdomen soft, nontender, nondistended. BS x4. No rebound, guarding, or rigidity present. No palpable masses. No CVA tenderness   Incisions: healing appropriately     DIAGNOSIS        A:  \"RIGHT BREAST MASS\":  INVASIVE DUCTAL CARCINOMA WITH LOBULAR   FEATURES, INTERMEDIATE GRADE (3 + 2 + 1), APPROXIMATELY 14 MM IN GREATEST   DIMENSION, MARGINS UNINVOLVED, WITHIN APPROXIMATELY 1 MM OF THE POSTERIOR   MARGIN. B:  \"RIGHT BREAST SENTINEL LYMPH NODE\":  BENIGN LYMPH NODE, NEGATIVE   FOR TUMOR. Assessment:  R IDC    Plan:  Pt overall doing well. Baseline mammo in 6m. I will get a CT scan of her chest as she is having some chest discomfort and feels somewhat short of breath. Continue to slowly increase their activities of daily living. No concerns at this time. Pt can see me on an as needed basis.

## 2022-09-15 DIAGNOSIS — Z17.0 MALIGNANT NEOPLASM OF UPPER-OUTER QUADRANT OF RIGHT BREAST IN FEMALE, ESTROGEN RECEPTOR POSITIVE (HCC): Primary | ICD-10-CM

## 2022-09-15 DIAGNOSIS — C50.411 MALIGNANT NEOPLASM OF UPPER-OUTER QUADRANT OF RIGHT BREAST IN FEMALE, ESTROGEN RECEPTOR POSITIVE (HCC): Primary | ICD-10-CM

## 2022-09-16 ENCOUNTER — HOSPITAL ENCOUNTER (OUTPATIENT)
Dept: CT IMAGING | Age: 57
Discharge: HOME | End: 2022-09-19
Payer: COMMERCIAL

## 2022-09-16 DIAGNOSIS — R07.81 RIB PAIN ON RIGHT SIDE: ICD-10-CM

## 2022-09-16 PROCEDURE — 71250 CT THORAX DX C-: CPT

## 2022-09-20 ENCOUNTER — PATIENT MESSAGE (OUTPATIENT)
Dept: INTERNAL MEDICINE CLINIC | Facility: CLINIC | Age: 57
End: 2022-09-20

## 2022-09-20 NOTE — TELEPHONE ENCOUNTER
Husam Torres MA 9/20/2022 3:12 PM EDT    Sending this note to Lizeth   ----- Message -----  From: Rich Marj: 9/20/2022 9:05 AM EDT  To: , *  Subject: Results     Good Morning. .. I recently had a CT of my chest for pain and it was discovered I have a seroma. What else I noticed was something going on with my heart. Please ask Sital to review and let me know if we need a plan for treatment.   Thank you,  Laura Canas

## 2022-09-20 NOTE — TELEPHONE ENCOUNTER
Reviewed the CT, \"Left anterior descending coronary artery atherosclerosis\" . can be addressed after her cancer treatment and care is complete. This can bee addressed sooner via cardiologist, to eval for CAD.  Please refer to cardiology, dx hyperlipidemia , CAD

## 2022-09-21 ENCOUNTER — OFFICE VISIT (OUTPATIENT)
Dept: SURGERY | Age: 57
End: 2022-09-21

## 2022-09-21 VITALS — TEMPERATURE: 98 F

## 2022-09-21 DIAGNOSIS — Z09 POSTOPERATIVE EXAMINATION: Primary | ICD-10-CM

## 2022-09-21 PROCEDURE — 99024 POSTOP FOLLOW-UP VISIT: CPT | Performed by: STUDENT IN AN ORGANIZED HEALTH CARE EDUCATION/TRAINING PROGRAM

## 2022-09-21 NOTE — TELEPHONE ENCOUNTER
Pt can wait until she see her oncologist on Friday, she can ask if cardiologist referral also recommended by them if pt will be having chemo, we can send referral for cardiologist if they also suggested her heart be evaluated before any treatments began.

## 2022-09-21 NOTE — PROGRESS NOTES
US GUIDED NEEDLE LOC OF RIGHT BREAST 8/18/2022 [facility] M[facility]MO        Family and Social History:  Family History   Problem Relation Age of Onset    COPD Mother     Heart Disease Mother     Hypertension Mother     Cancer Maternal Grandfather         skin ca    Cancer Father         colon ca     Social History     Socioeconomic History    Marital status: Single     Spouse name: Not on file    Number of children: Not on file    Years of education: Not on file    Highest education level: Not on file   Occupational History    Not on file   Tobacco Use    Smoking status: Former     Packs/day: 0.50     Types: Cigarettes     Quit date: 1/1/2012     Years since quitting: 10.7    Smokeless tobacco: Never   Substance and Sexual Activity    Alcohol use: Not Currently    Drug use: Not on file    Sexual activity: Not on file   Other Topics Concern    Not on file   Social History Narrative    Not on file     Social Determinants of Health     Financial Resource Strain: Low Risk     Difficulty of Paying Living Expenses: Not very hard   Food Insecurity: No Food Insecurity    Worried About Running Out of Food in the Last Year: Never true    [address] in the Last Year: Never true   Transportation Needs: No Transportation Needs    Lack of Transportation (Medical): No    Lack of Transportation (Non-Medical):  No   Physical Activity: Insufficiently Active    Days of Exercise per Week: 3 days    Minutes of Exercise per Session: 30 min   Stress: No Stress Concern Present    Feeling of Stress : Not at all   Social Connections: Unknown    Frequency of Communication with Friends and Family: More than three times a week    Frequency of Social Gatherings with Friends and Family: More than three times a week    Attends Episcopalian Services: Not on file    Active Member of Clubs or Organizations: Not on file    Attends Club or Organization Meetings: Not on file    Marital Status: Living with partner   Intimate Partner Violence: Not At Risk Fear of Current or Ex-Partner: No    Emotionally Abused: No    Physically Abused: No    Sexually Abused: No   Housing Stability: Unknown    Unable to Pay for Housing in the Last Year: No    Number of [place] in the Last Year: Not on file    Unstable Housing in the Last Year: No        REVIEW OF SYSTEMS: 10 Point ROS negative except what is in HPI    PHYSICAL EXAMINATION:    Temp 98 °F (36.7 °C) (Oral)     General Appearance AOx3, in no acute distress  Respiratory No chest wall deformities or tenderness, respiratory effort normal, no use of accessory muscles. Cardiovascular RRR. No chest wall tenderness. Gastrointestinal Abdomen soft, nontender, nondistended. BS x4. No rebound, guarding, or rigidity present. No palpable masses. No CVA tenderness   Incisions: healing appropriately     Assessment:  R IDC    Plan:  Pt overall doing well. Having some discomfort. I explained to her that at this point in time I cannot continue any pain medication. We need to start alternating between Tylenol and ibuprofen. I did explain that she does have a small seroma on her CT scan however I do not feel anything superficially that I could hit with a needle to drain it. We will continue to monitor this area. There are no signs of infection. Continue to slowly increase their activities of daily living. No concerns at this time. Pt can see me on an as needed basis.

## 2022-09-23 ENCOUNTER — OFFICE VISIT (OUTPATIENT)
Dept: ONCOLOGY | Age: 57
End: 2022-09-23
Payer: COMMERCIAL

## 2022-09-23 ENCOUNTER — HOSPITAL ENCOUNTER (OUTPATIENT)
Dept: LAB | Age: 57
Discharge: HOME | End: 2022-09-26
Payer: COMMERCIAL

## 2022-09-23 VITALS
HEIGHT: 68 IN | SYSTOLIC BLOOD PRESSURE: 152 MMHG | TEMPERATURE: 97.5 F | BODY MASS INDEX: 26.75 KG/M2 | HEART RATE: 54 BPM | OXYGEN SATURATION: 98 % | DIASTOLIC BLOOD PRESSURE: 71 MMHG | RESPIRATION RATE: 16 BRPM | WEIGHT: 176.5 LBS

## 2022-09-23 DIAGNOSIS — C50.411 MALIGNANT NEOPLASM OF UPPER-OUTER QUADRANT OF RIGHT BREAST IN FEMALE, ESTROGEN RECEPTOR POSITIVE (HCC): Primary | ICD-10-CM

## 2022-09-23 DIAGNOSIS — Z78.0 ASYMPTOMATIC MENOPAUSE: ICD-10-CM

## 2022-09-23 DIAGNOSIS — Z17.0 MALIGNANT NEOPLASM OF UPPER-OUTER QUADRANT OF RIGHT BREAST IN FEMALE, ESTROGEN RECEPTOR POSITIVE (HCC): ICD-10-CM

## 2022-09-23 DIAGNOSIS — C50.411 MALIGNANT NEOPLASM OF UPPER-OUTER QUADRANT OF RIGHT BREAST IN FEMALE, ESTROGEN RECEPTOR POSITIVE (HCC): ICD-10-CM

## 2022-09-23 DIAGNOSIS — Z17.0 MALIGNANT NEOPLASM OF UPPER-OUTER QUADRANT OF RIGHT BREAST IN FEMALE, ESTROGEN RECEPTOR POSITIVE (HCC): Primary | ICD-10-CM

## 2022-09-23 LAB
ALBUMIN SERPL-MCNC: 3.9 G/DL (ref 3.5–5)
ALBUMIN/GLOB SERPL: 1.4 {RATIO} (ref 1.2–3.5)
ALP SERPL-CCNC: 62 U/L (ref 50–136)
ALT SERPL-CCNC: 26 U/L (ref 12–65)
ANION GAP SERPL CALC-SCNC: 5 MMOL/L (ref 4–13)
AST SERPL-CCNC: 16 U/L (ref 15–37)
BASOPHILS # BLD: 0 K/UL (ref 0–0.2)
BASOPHILS NFR BLD: 1 % (ref 0–2)
BILIRUB SERPL-MCNC: 0.4 MG/DL (ref 0.2–1.1)
BUN SERPL-MCNC: 13 MG/DL (ref 6–23)
CALCIUM SERPL-MCNC: 8.9 MG/DL (ref 8.3–10.4)
CHLORIDE SERPL-SCNC: 111 MMOL/L (ref 101–110)
CO2 SERPL-SCNC: 26 MMOL/L (ref 21–32)
CREAT SERPL-MCNC: 1 MG/DL (ref 0.6–1)
DIFFERENTIAL METHOD BLD: ABNORMAL
EOSINOPHIL # BLD: 0.2 K/UL (ref 0–0.8)
EOSINOPHIL NFR BLD: 5 % (ref 0.5–7.8)
ERYTHROCYTE [DISTWIDTH] IN BLOOD BY AUTOMATED COUNT: 11.9 % (ref 11.9–14.6)
GLOBULIN SER CALC-MCNC: 2.7 G/DL (ref 2.3–3.5)
GLUCOSE SERPL-MCNC: 99 MG/DL (ref 65–100)
HCT VFR BLD AUTO: 39.6 % (ref 35.8–46.3)
HGB BLD-MCNC: 13.1 G/DL (ref 11.7–15.4)
IMM GRANULOCYTES # BLD AUTO: 0 K/UL (ref 0–0.5)
IMM GRANULOCYTES NFR BLD AUTO: 1 % (ref 0–5)
LYMPHOCYTES # BLD: 1.1 K/UL (ref 0.5–4.6)
LYMPHOCYTES NFR BLD: 27 % (ref 13–44)
MCH RBC QN AUTO: 32 PG (ref 26.1–32.9)
MCHC RBC AUTO-ENTMCNC: 33.1 G/DL (ref 31.4–35)
MCV RBC AUTO: 96.8 FL (ref 79.6–97.8)
MONOCYTES # BLD: 0.3 K/UL (ref 0.1–1.3)
MONOCYTES NFR BLD: 7 % (ref 4–12)
NEUTS SEG # BLD: 2.5 K/UL (ref 1.7–8.2)
NEUTS SEG NFR BLD: 59 % (ref 43–78)
NRBC # BLD: 0 K/UL (ref 0–0.2)
PLATELET # BLD AUTO: 229 K/UL (ref 150–450)
PMV BLD AUTO: 9.4 FL (ref 9.4–12.3)
POTASSIUM SERPL-SCNC: 4.4 MMOL/L (ref 3.5–5.1)
PROT SERPL-MCNC: 6.6 G/DL (ref 6.3–8.2)
RBC # BLD AUTO: 4.09 M/UL (ref 4.05–5.2)
SODIUM SERPL-SCNC: 142 MMOL/L (ref 136–145)
WBC # BLD AUTO: 4.2 K/UL (ref 4.3–11.1)

## 2022-09-23 PROCEDURE — 99214 OFFICE O/P EST MOD 30 MIN: CPT | Performed by: INTERNAL MEDICINE

## 2022-09-23 PROCEDURE — 80053 COMPREHEN METABOLIC PANEL: CPT

## 2022-09-23 PROCEDURE — 85025 COMPLETE CBC W/AUTO DIFF WBC: CPT

## 2022-09-23 PROCEDURE — 36415 COLL VENOUS BLD VENIPUNCTURE: CPT

## 2022-09-23 RX ORDER — ANASTROZOLE 1 MG/1
1 TABLET ORAL DAILY
Qty: 30 TABLET | Refills: 3 | Status: SHIPPED | OUTPATIENT
Start: 2022-09-23

## 2022-09-23 RX ORDER — ZOLPIDEM TARTRATE 10 MG/1
TABLET ORAL
COMMUNITY
Start: 2022-09-20

## 2022-09-23 ASSESSMENT — PATIENT HEALTH QUESTIONNAIRE - PHQ9
SUM OF ALL RESPONSES TO PHQ QUESTIONS 1-9: 0
1. LITTLE INTEREST OR PLEASURE IN DOING THINGS: 0
SUM OF ALL RESPONSES TO PHQ QUESTIONS 1-9: 0
2. FEELING DOWN, DEPRESSED OR HOPELESS: 0
SUM OF ALL RESPONSES TO PHQ QUESTIONS 1-9: 0
SUM OF ALL RESPONSES TO PHQ QUESTIONS 1-9: 0
SUM OF ALL RESPONSES TO PHQ9 QUESTIONS 1 & 2: 0

## 2022-09-23 NOTE — PATIENT INSTRUCTIONS
Patient Instructions from Today's Visit    Reason for Visit:  Follow up    Diagnosis Information:  https://[email or web address]/. net/about-us/asco-answers-patient-education-materials/sfmz-sdxtwdk-klvd-sheets  Patient was educated and given handouts published by ASCO entitled ASCO Answers Fact Sheets about their diagnosis of ASCO ANSWERS is a collection of oncologist-approved patient education materials developed by the [employer] of Clinical Oncology (ASCO) for people with cancer and their caregivers. Breast Cancer    What is breast cancer? Breast cancer begins when healthy breast cells change and grow out of control, usually forming a mass called a tumor. Breast cancer is the most common type of cancer diagnosed in women in the [facility] (excluding skin cancer). What are the parts of the breast?   Most of the breast is fatty tissue. However, it also contains a network of lobes that are made up of tiny, tube-like structures called lobules that contain milk glands. Tiny ducts connect the glands, lobules, and lobes, and carry milk from the lobes to the nipple. Most breast cancers begin in the cells lining the milk ducts and are called ductal carcinomas. The second most common type starts in the lobules and is called lobular carcinoma. What does stage mean? The stage is a way of describing where the cancer is located, how much the cancer has grown, and if or where it has spread. There are 5 stages for breast cancer: stage 0 (zero), which is called noninvasive cancer or ductal carcinoma in situ (DCIS), and stages I through IV (1 through 4). Descriptions and illustrations of these stages are available at www.cancer. net/breast.     How is breast cancer treated? The biology and behavior of a breast cancer affect the treatment plan, and every persons cancer is different.  Doctors consider many factors when recommending a treatment plan, including the cancers stage; the tumors human epidermal growth factor receptor 2 (HER2) status and the hormone receptor status, which includes estrogen receptors (ER) and progesterone receptors (MS); the presence of known mutations (changes) in breast cancer genes; and the womans age, general health, and whether she has experienced menopause. For earlier stages of cancer, surgery to remove the tumor and nearby lymph nodes usually is the first treatment. Additional treatment with chemotherapy, radiation therapy, hormonal therapy, or targeted therapy is usually given after surgery to lower the risk of the cancer returning. These treatments may also be given before surgery to shrink the size of the tumor. The treatment of cancer that has spread or come back after treatment depends on many factors. It can include the therapies listed above used in a different combination or at a different pace. When making treatment decisions, women may also consider a clinical trial; talk with your doctor about all treatment options. The side effects of breast cancer treatment can be reduced or managed with a variety of medications and the help of your health care team. This is called palliative care and is an important part of the overall treatment plan. How can I cope with breast cancer? Absorbing the news of a cancer diagnosis and communicating with your health care team are key parts of the coping process. Seeking support, organizing your health information, making sure all of your questions are answered, and participating in the decision-making process are other steps. Talk with your health care team about any concerns. Understanding your emotions and those of people close to you can be helpful in managing the diagnosis, treatment, and healing process. [address]. © 2004 AMERICAN SOCIETY OF CLINICAL ONCOLOGY.  MADE AVAILABLE THROUGH   Questions to ask the health care team   Regular communication is important in making informed decisions about your health care. Consider asking the following questions of your health care team:   What type of breast cancer do I have? Can you explain my pathology report (laboratory test results) to me? What stage is the breast cancer? What does this mean? What is the ER/VA status of the tumor? The HER2 status? What does this                       mean? Would you explain my treatment options? What clinical trials are available for me? Where are they located, and how do I                 find out more about them? What treatment plan do you recommend? Why? Should treatment before surgery be considered? What is the goal of each treatment? Is it to eliminate the cancer, help me feel                   better, or both? Who will be part of my treatment team, and what does each member do? How will this treatment affect my daily life? Will I be able to work, exercise, and                perform my usual activities? Will this treatment affect my ability to become pregnant or have children? What                can be done to preserve my fertility? What long-term side effects are associated with my cancer treatment? If Im worried about managing the costs of cancer care, who can help me? Where can I find emotional support for me and my family? Whom should I call with questions or problems? Is there anything else I should be asking? Find more questions to ask the health care team at [address] net/breast and www.cancer. net/metastaticbreast. For a digital list of questions, download Cancer. Nets free mobile eliza at www.cancer. net/eliza. The ideas and opinions expressed here do not necessarily reflect the opinions of the American Society of Clinical Oncology (ASCO) or The [employer]. The information in this fact sheet is not intended as medical or legal advice, or as a substitute for consultation with a physician or other licensed health care provider.  Patients with health care-related questions should call or see their physician or other health care provider promptly and should not disregard professional medical advice, or delay seeking it, because of information encountered here. The mention of any product, service, or treatment in this fact sheet should not be construed as an ASCO endorsement. ASCO is not responsible for any injury or damage to persons or property arising out of or related to any use of ASCOs patient education materials, or to any errors or omissions. To order more printed copies, please call [phone] or visit www.cancer. net/estore. TERMS TO KNOW     Benign: A growth that is not cancerous     Biopsy: Removal of a small tissue sample that is examined under a microscope to check for cancer cells     Chemotherapy: The use of drugs to destroy cancer cells     DCIS: Ductal carcinoma in situ; cancer that has not spread past the ducts and is not invasive     Lymph node: A tiny, bean-shaped organ that fights infection     Lumpectomy: The surgical removal of the tumor and an area of healthy tissue around the tumor     Malignant: A cancerous growth or mass     Mastectomy: Surgical removal of the entire breast     Metastasis: The spread of cancer to another part of the body, usually to another organ     Oncologist: A doctor who specializes in treating cancer     Radiation therapy: The use of high-energy x-rays to destroy cancer cells     Tumor: An abnormal growth of body tissue     [address] [facility], [address] [place] [address], [address] [place] [address]  Toll Free: [phone]  Phone: [phone] www. [employer]  www.My [employer] App[employer]. org © 2017 American Society of Clinical Oncology. For permissions information, contact [email or web address]@[email or web address].   AABC17   during todays office visit. Plan:  Good news is that this came back as stage 1 breast cancer.  We did testing on the tumor called oncotype that tells us if you would benefit from chemotherapy and that 09/23/2022 13  6 - 23 MG/DL Final    Creatinine 09/23/2022 1.00  0.6 - 1.0 MG/DL Final    GFR  09/23/2022 >60  >60 ml/min/1.73m2 Final    GFR Non- 09/23/2022 >60  >60 ml/min/1.73m2 Final    Comment:      Estimated GFR is calculated using the Modification of Diet in Renal Disease (MDRD) Study equation, reported for both  Americans (GFRAA) and non- Americans (GFRNA), and normalized to 1.73m2 body surface area. The physician must decide which value applies to the patient. The MDRD study equation should only be used in individuals age 25 or older. It has not been validated for the following: pregnant women, patients with serious comorbid conditions,or on certain medications, or persons with extremes of body size, muscle mass, or nutritional status. Calcium 09/23/2022 8.9  8.3 - 10.4 MG/DL Final    Total Bilirubin 09/23/2022 0.4  0.2 - 1.1 MG/DL Final    ALT 09/23/2022 26  12 - 65 U/L Final    AST 09/23/2022 16  15 - 37 U/L Final    Alk Phosphatase 09/23/2022 62  50 - 136 U/L Final    Total Protein 09/23/2022 6.6  6.3 - 8.2 g/dL Final    Albumin 09/23/2022 3.9  3.5 - 5.0 g/dL Final    Globulin 09/23/2022 2.7  2.3 - 3.5 g/dL Final    Albumin/Globulin Ratio 09/23/2022 1.4  1.2 - 3.5   Final        Treatment Summary has been discussed and given to patient: n/a        -------------------------------------------------------------------------------------------------------------------  Please call our office at [phone] if you have any  of the following symptoms:   Fever of 100.5 or greater  Chills  Shortness of breath  Swelling or pain in one leg    After office hours an answering service is available and will contact a provider for emergencies or if you are experiencing any of the above symptoms. Patient did express an interest in My Chart. My Chart log in information explained on the after visit summary printout at the [facility] P[address] desk.     Henrique Wright RN Anastrozole     Select Language?   (an AS tromateo zole)  Trade Name: Dick Heading  Anastrozole is the generic name for the trade name drug Arimidex®. In some cases, health care professionals may use the trade name Dick Heading when referring to the generic drug name Anastrozole. Drug Type:  Anastrozole is an anti-cancer hormone therapy. This medication is classified as a \"non-steroidal aromatase inhibitor\" (For more detail see \"How Anastrozole Works\" below). What Anastrozole Is Used For:  Anastrozole is used to treat breast cancer in postmenopausal women. Note: If a drug has been approved for one use, physicians sometimes elect to use this same drug for other problems if they believe it might be helpful. How Anastrozole Is Given:  Anastrozole is a pill, taken by mouth. You should take anastrozole at about the same time each day. You may take anastrozole with or without food. If you miss a dose of anastrozole, take it as soon as you remember unless it is almost time for the next dose. Do not take a double dose to make up a missed dose. You should not stop taking anastozole without discussing with your physician, even if you feel well. The amount of anastrozole that you will receive depends on many factors, including your general health or other health problems, and the type of cancer or condition being treated. Your doctor will determine your dose and how long you will be taking anastrozole. Side Effects:  Important things to remember about the side effects of anastrozole:  Most people do not experience all of the anastrozole side effects listed. Anastrozole side effects are often predictable in terms of their onset, duration, and severity. Anastrozole side effects will improve after therapy is complete. Anastrozole side effects may be quite manageable: There are many options to help minimize or prevent the side effects of anastrozole.   The following side effects are common (occurring in greater than 30%) for patients taking Anastrozole: Hot Flashes   Muscle/Joint pain   Stomach upset  The following side effects are less common (occurring in 10-29%) for patients taking anastrozole:  Decreased energy   Mood disturbances   Sore throat   High blood pressure   Depression   Nausea   Vomiting   Rash   Osteoporosis (Weak bones)   Fractures   Back pain   Insomnia (Trouble sleeping)   Headache   Peripheral edema and lymphedema (fluid build-up)   Dyspnea (difficulty breathing)   Increased cough  Not all side effects are listed above, some that are rare (occurring in less than 10% of patients) are not listed here. However, you should always inform your health care provider if you experience any unusual symptoms. When To Contact Your Doctor or Health Care Provider:  Contact your health care provider immediately, day or night and go to the nearest emergency room, if you should experience any of the following symptoms:  New or worsening chest pain   Shortness of breath   Swelling of the face, lips, tongue and/or throat   Difficulty swallowing and/or breathing. The following symptoms require medical attention, but are not emergency situations. Contact your health care provider within 24 hours of noticing any of the following:  Vaginal bleeding (similar to a period)   Tickling, Tingling, or Numbness of your skin   Nausea (interfering with ability to eat and unrelieved with prescribed medication)   Vomiting (vomiting more than 4-5 times in a 24 hour period)   Yellowing of your skin or the whites of your eyes   Pain on the right side of your stomach-area  Always inform your health care provider if you experience any unusual symptoms. Precautions:  Before starting anastrozole treatment, make sure you tell your doctor about any other medications you are taking (including over-the-counter drugs, vitamins, or herbal remedies)   Anastrozole interacts with certain medications.  Make sure you tell your doctor if you are taking these medications:   Tamozifen   Estrogen   Warfarin   Estrogen/estradiol-containing medications (commonly used for menopause and birth-control)  Before starting anastrozole treatment, make sure you tell your doctor about health conditions you have including: heart conditions, osteoporosis, and abnormal cholesterol. Anastrozole may cause increased risk for heart disease. Talk with your healthcare provider to weigh the benefit and risks. Inform your health care professional if you have not had menopause yet (premenopausal). Inform your health care professional if you are pregnant or may be pregnant prior to starting this treatment. Anastrozole is pregnancy category X (anastrozole may be hazardous to the fetus. Anastrozole is contraindicted in women who are pregnant or may become pregnant). Anastrozole may enter breast milk. It is unclear what effect this may have on babies. Tell your doctor if you are breastfeeding or plan to breastfeed. Self Care Tips:  If you experience hot flashes, wearing light clothing, staying in a cool environment, and putting cool cloths on your head may reduce symptoms. Consult your health care provider if these worsen, or become intolerable. Acetaminophen or ibuprofen may help relieve discomfort from generalized aches and pains. However, be sure to talk with your doctor before taking it. Anastrozole causes little nausea. However, to reduce nausea, take anti-nausea medications as prescribed by your doctor before taking it. Good health practices such as getting plenty of rest and eating a healthy diet along with regular exercise are recommended   If you experience symptoms or side effects, be sure to discuss them with your health care team. They can prescribe medications and/or offer other suggestions that are effective in managing such problems. Monitoring and Testing While Taking Anastrozole:   You will be checked regularly by your doctor while you are taking anastrozole, to monitor side effects and check your response to therapy. No additional blood work or tests are required for anastrozole. How Anastrozole Works:  Hormones are chemical substances that are produced by glands in the body, which enter the bloodstream and can cause effects in other parts of the body. For example, the hormone testosterone is made in the testicles and is responsible for male characteristics such as deepening voice and increased body hair. The use of hormone therapy to treat cancer is based on the observation that cancer cell growth can partially depend on hormone binding to receptors on the cancer cell surface. Hormone therapies can work through methods such as stopping the production of a certain hormone or interfering with hormone binding to the cancer cell receptor. The different types of hormone therapies are categorized by their function and/or the type of hormone that is affected. Anastrozole is an aromatase inhibitor. This means it blocks the enzyme aromatase (found in the body's muscle, skin, breast and fat), which is used to convert androgens (hormones produced by the adrenal glands) into estrogens. Tumor cells dependent on estrogens grow less when there is no estrogen. Note: We strongly encourage you to talk with your health care professional about your specific medical condition and treatments. The information contained inthis website is meant to be helpful and educational, but is not a substitute for medical advice. Anastrozole. Veronica [May 13th, 2016]. [employer].  Daisy Gunter, [facility]: [address] [place] [address].; June 29th, 2016

## 2022-09-23 NOTE — PROGRESS NOTES
[facility] Hematology and Oncology: Office Visit Established Patient    Chief Complaint:    Chief Complaint   Patient presents with    Follow-up         History of Present Illness:  Ms. Shari Blum is a 62 y.o. female who presents today for follow-up regarding breast cancer. She initially presented for a routine bilateral screening mammogram on 7/6/22 which identified a possible right breast architectural distortion. Further evaluation with right breast diagnostic mammogram and right breast ultrasound on 7/15/22 confirmed a 1.6 cm mass by ultrasound and suspicious features by mammography. Biopsy confirmed low grade IDC with lobular features, ER 90%/MO 33% positive, HER-2 (1+) negative. MRI of the bilateral breasts was completed on 7/27/22 demonstrating the mass to be 1.7 cm with no other suspicious findings. Ms. Shari Blum was referred to [facility] for oncology evaluation and treatment of newly diagnosed breast cancer. She is clinical T1cN0 and is an appropriate candidate for upfront surgery, lumpectomy vs mastectomy with sentinel node biopsy. If she is pathologic T1cN0, she would be an excellent candidate for Oncotype Dx for risk stratification. She would need radiation if BCT and will require endocrine therapy under any circumstance, AI due to postmenopause. Here for follow-up. She completed surgical management with lumpectomy and sentinel node biopsy, she tolerated this OK. She is still having a lot of breast tenderness associated with a post-op seroma. Review of Systems:  Constitutional: Negative. HENT: Negative. Eyes: Negative. Respiratory: Negative. Cardiovascular: Negative. Gastrointestinal: Negative. Genitourinary: Negative. Musculoskeletal: Negative. Skin: Negative. Neurological: Negative. Endo/Heme/Allergies: Negative. Psychiatric/Behavioral: Negative. All other systems reviewed and are negative.      Allergies   Allergen Reactions    Cephalexin Rash    Droperidol Other (See Comments)     Seizure  Seizure      Penicillins Rash    Aripiprazole Other (See Comments)     Bad reaction     Past Medical History:   Diagnosis Date    Anxiety     Depression     Osteoporosis     Pneumonia     Sleep disorder     Stomach ulcer     UTI (urinary tract infection)      Past Surgical History:   Procedure Laterality Date    BREAST BIOPSY Right 07/19/2022    US Core Bx    BREAST BIOPSY Right 08/31/2022    RIGHT BREAST LUMPECTOMY WITH MAG SEED 4th case performed by Manolo Gill DO at [address] Right 08/31/2022    RIGHT BREAST BIOPSY SENTINEL NODE DISSECTION performed by Manolo Gill DO at [address] (CERVIX STATUS UNKNOWN)  2007    ORTHOPEDIC SURGERY Left 2002    $ surgeries left hand same yr    MT ABDOMEN SURGERY PROC UNLISTED      TOTAL KNEE ARTHROPLASTY Right 2014    US BREAST NEEDLE BIOPSY RIGHT Right 07/19/2022    US BREAST NEEDLE BIOPSY RIGHT 7/19/2022 Enzo Ortez MD [facility]    US GUIDED NEEDLE LOC OF RIGHT BREAST Right 08/18/2022    US GUIDED NEEDLE LOC OF RIGHT BREAST 8/18/2022 [facility]     Family History   Problem Relation Age of Onset    COPD Mother     Heart Disease Mother     Hypertension Mother     Cancer Maternal Grandfather         skin ca    Cancer Father         colon ca     Social History     Socioeconomic History    Marital status: Single     Spouse name: Not on file    Number of children: Not on file    Years of education: Not on file    Highest education level: Not on file   Occupational History    Not on file   Tobacco Use    Smoking status: Former     Packs/day: 0.50     Types: Cigarettes     Quit date: 1/1/2012     Years since quitting: 10.7    Smokeless tobacco: Never   Substance and Sexual Activity    Alcohol use: Not Currently    Drug use: Not on file    Sexual activity: Not on file   Other Topics Concern    Not on file   Social History Narrative    Not on file     Social Determinants of Health Nasal route daily PRN       No current facility-administered medications for this visit. OBJECTIVE:  BP (!) 152/71 (Site: Left Upper Arm, Position: Standing, Cuff Size: Medium Adult)   Pulse 54   Temp 97.5 °F (36.4 °C) (Oral)   Resp 16   Ht 5' 8\" (1.727 m)   Wt 176 lb 8 oz (80.1 kg)   SpO2 98%   BMI 26.84 kg/m²     Physical Exam:  Constitutional: Well developed, well nourished female in no acute distress, sitting comfortably on the examination table. HEENT: Normocephalic and atraumatic. Sclerae anicteric. Skin Warm and dry. No bruising and no rash noted. No erythema. No pallor. Cardiopulmonary Deferred. Neuro Grossly nonfocal with no obvious sensory or motor deficits. MSK Normal range of motion in general.  No edema and no tenderness. Psych Appropriate mood and affect.        Labs:  Recent Results (from the past 24 hour(s))   CBC with Auto Differential    Collection Time: 09/23/22  9:13 AM   Result Value Ref Range    WBC 4.2 (L) 4.3 - 11.1 K/uL    RBC 4.09 4.05 - 5.2 M/uL    Hemoglobin 13.1 11.7 - 15.4 g/dL    Hematocrit 39.6 35.8 - 46.3 %    MCV 96.8 79.6 - 97.8 FL    MCH 32.0 26.1 - 32.9 PG    MCHC 33.1 31.4 - 35.0 g/dL    RDW 11.9 11.9 - 14.6 %    Platelets 859 870 - 957 K/uL    MPV 9.4 9.4 - 12.3 FL    nRBC 0.00 0.0 - 0.2 K/uL    Seg Neutrophils 59 43 - 78 %    Lymphocytes 27 13 - 44 %    Monocytes 7 4.0 - 12.0 %    Eosinophils % 5 0.5 - 7.8 %    Basophils 1 0.0 - 2.0 %    Immature Granulocytes 1 0.0 - 5.0 %    Segs Absolute 2.5 1.7 - 8.2 K/UL    Absolute Lymph # 1.1 0.5 - 4.6 K/UL    Absolute Mono # 0.3 0.1 - 1.3 K/UL    Absolute Eos # 0.2 0.0 - 0.8 K/UL    Basophils Absolute 0.0 0.0 - 0.2 K/UL    Absolute Immature Granulocyte 0.0 0.0 - 0.5 K/UL    Differential Type AUTOMATED     Comprehensive Metabolic Panel    Collection Time: 09/23/22  9:13 AM   Result Value Ref Range    Sodium 142 136 - 145 mmol/L    Potassium 4.4 3.5 - 5.1 mmol/L    Chloride 111 (H) 101 - 110 mmol/L    CO2 26 21 - 32 mmol/L    Anion Gap 5 4 - 13 mmol/L    Glucose 99 65 - 100 mg/dL    BUN 13 6 - 23 MG/DL    Creatinine 1.00 0.6 - 1.0 MG/DL    GFR African American >60 >60 ml/min/1.73m2    GFR Non- >60 >60 ml/min/1.73m2    Calcium 8.9 8.3 - 10.4 MG/DL    Total Bilirubin 0.4 0.2 - 1.1 MG/DL    ALT 26 12 - 65 U/L    AST 16 15 - 37 U/L    Alk Phosphatase 62 50 - 136 U/L    Total Protein 6.6 6.3 - 8.2 g/dL    Albumin 3.9 3.5 - 5.0 g/dL    Globulin 2.7 2.3 - 3.5 g/dL    Albumin/Globulin Ratio 1.4 1.2 - 3.5         Imaging:  MRI BREAST BILATERAL W WO CONTRAST    Result Date: 7/28/2022  MRI of the Breasts with and without contrast CLINICAL INDICATION:  Recent diagnosis of right breast IDC with lobular features at 10:00 position right breast. COMPARISON: Mammogram 07/06/2022, mammograms and ultrasound 07/15/2022. Breast biopsy and postbiopsy images 07/19/2022. TECHNIQUE: Standard MRI sequences were obtained through the breasts in multiple planes. Images were obtained before and after intravenous infusion of 16 mL of Prohance contrast. Images were reviewed with PACS and with [employer] Software CAD software. FINDINGS: The breasts demonstrate moderate glandularity and minimal background enhancement. There is an enhancing mass with irregular margins at the 10:00 position right breast corresponding to the site of biopsy on recent imaging. This measures approximately 1.7 x 1.5 x 1.7 cm in AP, transverse and craniocaudal dimensions, respectively. There is a focus of susceptibility artifact within the mass compatible with clip placement. No additional suspicious abnormality was identified within either breast. There is no evidence of axillary or internal mammary lymphadenopathy. Elsewhere, limited visualization of the partially included thorax and upper abdomen shows no acute abnormality. 1.7 cm right breast mass at 10:00 compatible with the patient's known neoplasm. No additional suspicious abnormality was identified.  Clinical management is recommended. BI-RADS Assessment Category 6: Known Biopsy Proven Malignancy     STEVEN DIGITAL SCREEN W OR WO CAD BILATERAL    Result Date: 7/7/2022  STUDY:  Bilateral digital screening mammogram with CAD INDICATION:  Screening;  no family history of breast carcinoma. COMPARISON:  None. Remote studies are reportedly unavailable. This will serve as the patient's new baseline study. FINDINGS: Bilateral digital mammography was performed, and is interpreted in conjunction with a computer assisted detection (CAD) system. The breast parenchyma is heterogeneously dense which may limit the detection of small masses. There is suspicion for architectural distortion within the upper outer right breast, mid to posterior depth. Spot compression views in the CC and MLO projection as well as a full 90 degree mediolateral view are recommended for further evaluation. If this finding persists an ultrasound should be considered. There are no suspicious clustered microcalcifications. Possible right breast architectural distortion. BI-RADS Assessment Category 0: Incomplete: Needs additional imaging evaluation. We will attempt to contact the patient and arrange for this additional imaging. A reminder letter will be sent to the patient at the appropriate time pending additional views. BD3 We are mailing breast density information to the patient along with the mammogram report. [facility] DIGITAL DIAGNOSTIC W OR WO CAD RIGHT    Result Date: 7/15/2022  RIGHT BREAST DIAGNOSTIC MAMMOGRAM and RIGHT BREAST ULTRASOUND, 7/15/2022. CLINICAL HISTORY: Questionable architectural distortion seen on baseline screening mammogram. COMPARISON STUDY: Screening mammogram 7/6/2022. FINDINGS: RIGHT BREAST DIAGNOSTIC MAMMOGRAM: Straight mediolateral view of the right breast as well as compression images of the right breast in the CC, and MLO plane are submitted for evaluation.  Previously, question of architectural changes were seen in the upper outer, mid right breast. These are once again suggested following focal compression. Further evaluation with focused diagnostic ultrasound is recommended. RIGHT BREAST ULTRASOUND, 7/15/2022. CLINICAL HISTORY: Right breast architectural distortion. Technique: Grayscale, and Doppler imaging of the right breast soft tissues was performed using a 15 MHz transducer. FINDINGS: Focused ultrasound imaging at the 10 o'clock position of the right breast in the area of suggested architectural changes as seen on the recent mammogram imaging shows an irregular marginated heterogeneous, mildly shadowing mass measuring 1.6 cm x 1.3 cm x 0.2 cm.     1. 1.6 cm maximal diameter heterogeneous hypoechoic mass by ultrasound also demonstrating suspicious features by mammography. Further evaluation with ultrasound-guided biopsy is recommended. BI-RADS Assessment Category 4c: Suspicious Finding- Biopsy should be considered. US BREAST LIMITED RIGHT    Result Date: 7/15/2022  RIGHT BREAST DIAGNOSTIC MAMMOGRAM and RIGHT BREAST ULTRASOUND, 7/15/2022. CLINICAL HISTORY: Questionable architectural distortion seen on baseline screening mammogram. COMPARISON STUDY: Screening mammogram 7/6/2022. FINDINGS: RIGHT BREAST DIAGNOSTIC MAMMOGRAM: Straight mediolateral view of the right breast as well as compression images of the right breast in the CC, and MLO plane are submitted for evaluation. Previously, question of architectural changes were seen in the upper outer, mid right breast. These are once again suggested following focal compression. Further evaluation with focused diagnostic ultrasound is recommended. RIGHT BREAST ULTRASOUND, 7/15/2022. CLINICAL HISTORY: Right breast architectural distortion. Technique: Grayscale, and Doppler imaging of the right breast soft tissues was performed using a 15 MHz transducer.  FINDINGS: Focused ultrasound imaging at the 10 o'clock position of the right breast in the area of suggested architectural changes as seen on the recent mammogram imaging shows an irregular marginated heterogeneous, mildly shadowing mass measuring 1.6 cm x 1.3 cm x 0.2 cm.     1. 1.6 cm maximal diameter heterogeneous hypoechoic mass by ultrasound also demonstrating suspicious features by mammography. Further evaluation with ultrasound-guided biopsy is recommended. BI-RADS Assessment Category 4c: Suspicious Finding- Biopsy should be considered. MAMMOGRAM POST BX CLIP PLACEMENT RIGHT    Addendum Date: 7/22/2022    Addendum: ADDENDUM, 7/19/2022: Pathology was noted as A: Right breast, 10:00 position, 8 cm from nipple, core biopsy: Infiltrating ductal carcinoma with lobular features, low grade (well differentiated). Definite in situ component and lymphovascular invasion are not identified. Results concordant with imaging. Pathology report was faxed to Dr. Maura Tirado office on 7/20/2022 by RT Dianelys(CHADD)([place]). Patient was referred to Oncology services on 7/21/2022. Result Date: 7/22/2022  RIGHT BREAST CORE NEEDLE BIOPSY AND MARKER CLIP PLACEMENT WITH ULTRASOUND GUIDANCE, RIGHT DIAGNOSTIC DIGITAL MAMMOGRAPHY: CLINICAL HISTORY:  Abnormal new baseline mammogram and ultrasound for histologic diagnosis. BIOPSY AND CLIP PLACEMENT:  Written informed consent was obtained. Preliminary ultrasound evaluation of the right axilla demonstrated no pathologically enlarged or dysmorphic nodes. Using standard aseptic technique and 1% Xylocaine local anesthesia, a 14-gauge Achieve core biopsy needle was used to obtain a total of 4 samples of the 1.6 cm irregular, heterogeneous mass with variable acoustical shadowing associated with mammographic architectural distortion at the 10:00 position 8 cm from the nipple confirmed at mammography and ultrasound on July 12, 2022. The samples were placed in formalin for histologic evaluation, and a titanium marker clip was placed through the cannula.  POST-BIOPSY RIGHT MAMMOGRAM:  Craniocaudal and straight lateral views demonstrate the biopsy marker clip in expected position. The patient tolerated the procedure well without immediate complication and left the department in good condition. 1.  Successful core needle biopsy and marker clip placement for the 1.6 cm irregular mass at the 10:00 position. 2.  If histology fails to confirm malignancy, then followup surgical consultation would be indicated for ultrasound wire localization and excisional biopsy of this mass with very worrisome imaging characteristics. Thank you for referral of this patient. US BREAST BIOPSY W LOC DEVICE 1ST LESION RIGHT    Addendum Date: 7/22/2022    Addendum: ADDENDUM, 7/19/2022: Pathology was noted as A: Right breast, 10:00 position, 8 cm from nipple, core biopsy: Infiltrating ductal carcinoma with lobular features, low grade (well differentiated). Definite in situ component and lymphovascular invasion are not identified. Results concordant with imaging. Pathology report was faxed to Dr. Mauricio Saavedra office on 7/20/2022 by RT Julian(CHADD)([place]). Patient was referred to Oncology services on 7/21/2022. Result Date: 7/22/2022  RIGHT BREAST CORE NEEDLE BIOPSY AND MARKER CLIP PLACEMENT WITH ULTRASOUND GUIDANCE, RIGHT DIAGNOSTIC DIGITAL MAMMOGRAPHY: CLINICAL HISTORY:  Abnormal new baseline mammogram and ultrasound for histologic diagnosis. BIOPSY AND CLIP PLACEMENT:  Written informed consent was obtained. Preliminary ultrasound evaluation of the right axilla demonstrated no pathologically enlarged or dysmorphic nodes. Using standard aseptic technique and 1% Xylocaine local anesthesia, a 14-gauge Achieve core biopsy needle was used to obtain a total of 4 samples of the 1.6 cm irregular, heterogeneous mass with variable acoustical shadowing associated with mammographic architectural distortion at the 10:00 position 8 cm from the nipple confirmed at mammography and ultrasound on July 12, 2022.   The samples were placed in formalin for histologic evaluation, and a titanium marker clip was placed through the cannula. POST-BIOPSY RIGHT MAMMOGRAM:  Craniocaudal and straight lateral views demonstrate the biopsy marker clip in expected position. The patient tolerated the procedure well without immediate complication and left the department in good condition. 1.  Successful core needle biopsy and marker clip placement for the 1.6 cm irregular mass at the 10:00 position. 2.  If histology fails to confirm malignancy, then followup surgical consultation would be indicated for ultrasound wire localization and excisional biopsy of this mass with very worrisome imaging characteristics. Thank you for referral of this patient. Pathology:  DIAGNOSIS        A:  \"RIGHT BREAST MASS\":  INVASIVE DUCTAL CARCINOMA WITH LOBULAR   FEATURES, INTERMEDIATE GRADE (3 + 2 + 1), APPROXIMATELY 14 MM IN GREATEST   DIMENSION, MARGINS UNINVOLVED, WITHIN APPROXIMATELY 1 MM OF THE POSTERIOR   MARGIN. B:  \"RIGHT BREAST SENTINEL LYMPH NODE\":  BENIGN LYMPH NODE, NEGATIVE   FOR TUMOR. Procedures/Addenda                     [facility] - ONCOTYPE DX ASSAY                                                                                                                                         Status:  Signed Out    Heber Benoit MD on 9/19/2022                                 Interpretation                       Oncotype DX Breast Recurrence Score Report: A5     Recurrence Score (RS) Result:  21         ASSESSMENT:   Diagnosis Orders   1. Malignant neoplasm of upper-outer quadrant of right breast in female, estrogen receptor positive ([address]  [address] Radiation Oncology          Patient Active Problem List   Diagnosis    Depression    Anxiety    Gastroesophageal reflux disease without esophagitis    Swelling    Infiltrating ductal carcinoma of breast, right ([address]           PLAN:  Lab studies were personally reviewed.     Pertinent old records were reviewed. Breast cancer: low grade, IDC with lobular features, 1.7 cm on MRI, right breast, ER/PA positive, HER2 1+. No suspicious lymphadenopathy. I discussed the pathophysiology and natural history of breast cancer with Ms. Stacy Annabelle. The usual treatment modalities employed for breast cancer include surgery, chemotherapy, radiation, or endocrine therapy in some combination. She is clinical T1cN0 and is an appropriate candidate for upfront surgery, lumpectomy vs mastectomy with sentinel node biopsy. If she is pathologic T1cN0, she would be an excellent candidate for Oncotype Dx for risk stratification. She would need radiation if BCT and will require endocrine therapy under any circumstance, AI due to postmenopause. Here for follow-up. She completed surgical management with lumpectomy and sentinel node biopsy, she tolerated this OK. She is still having a lot of breast tenderness associated with a post-op seroma. I deferred the questions about symptom management (including the need for pain medication) to surgery, and I would also like her to see Suzy to discuss any lymphedema management. Path reviewed and shows 1.4 cm of tumor with negative margins, 1 lymph node was also negative. She had Oncotype Dx analysis due to the T1cN0 disease, this was low risk at 24. Therefore, we will recommend antiestrogen therapy alone. She is post-menopausal, so a prescription was given for anastrozole. Side effects were reviewed, including the potential for joint aches and pains, menopausal type symptoms including hot flashes, mood swings, diaphoresis, and fluid retention, as well as the possibility of accelerated bone loss. We will update her bone density scan. We also will start prophylactic calcium and vitamin D replacement. We will also refer her to radiation, although with her seroma this may end up being delayed. With that in mind, I am comfortable with her starting AI now.   She understands and agrees to proceed. All questions were asked and answered to the best of my ability. F/u in 3 months for toxicity check on Arimidex.           Yuliya Solano MD, MD  [facility] Hematology and Oncology  [address] [place] [address]  Office : [phone]  Fax : [phone]

## 2022-10-04 ENCOUNTER — HOSPITAL ENCOUNTER (OUTPATIENT)
Dept: RADIATION ONCOLOGY | Age: 57
Setting detail: RECURRING SERIES
Discharge: HOME | End: 2022-10-07
Payer: COMMERCIAL

## 2022-10-04 VITALS
DIASTOLIC BLOOD PRESSURE: 69 MMHG | SYSTOLIC BLOOD PRESSURE: 118 MMHG | HEART RATE: 54 BPM | BODY MASS INDEX: 26.35 KG/M2 | TEMPERATURE: 97.7 F | WEIGHT: 173.3 LBS

## 2022-10-04 PROBLEM — Z17.0 MALIGNANT NEOPLASM OF UPPER-OUTER QUADRANT OF RIGHT BREAST IN FEMALE, ESTROGEN RECEPTOR POSITIVE (HCC): Status: ACTIVE | Noted: 2022-08-04

## 2022-10-04 PROBLEM — C50.411 MALIGNANT NEOPLASM OF UPPER-OUTER QUADRANT OF RIGHT BREAST IN FEMALE, ESTROGEN RECEPTOR POSITIVE (HCC): Status: ACTIVE | Noted: 2022-08-04

## 2022-10-04 PROCEDURE — 99211 OFF/OP EST MAY X REQ PHY/QHP: CPT

## 2022-10-04 ASSESSMENT — PAIN DESCRIPTION - FREQUENCY: FREQUENCY: CONTINUOUS

## 2022-10-04 ASSESSMENT — PAIN DESCRIPTION - DESCRIPTORS: DESCRIPTORS: SORE

## 2022-10-04 ASSESSMENT — PAIN DESCRIPTION - ORIENTATION: ORIENTATION: RIGHT;OUTER

## 2022-10-04 ASSESSMENT — PAIN DESCRIPTION - LOCATION: LOCATION: BREAST

## 2022-10-04 ASSESSMENT — PAIN SCALES - GENERAL: PAINLEVEL_OUTOF10: 4

## 2022-10-04 NOTE — PROGRESS NOTES
Pt is here today for the initial RT consult with Dr. Dago Diggs for right IDC breast cancer with negative margins, negative SN. Pt has an Oncotype of 21 and does not need chemo. She is followed by Dr. Alfonso Hernandez in [address] [place] [address] and will likely receive Arimidex. Pt stated that she starts Onc Rehab next week for lymphedema. She is c/o of the lumpectomy site being very sore from a seroma. She is s/p a right lumpectomy by Dr. Ann Marie Glover. An overview of RT and the Skin Care Guidelines handout were given. Pt is aware of the CT/Sim appt on 10/18/22. RT consents were signed.

## 2022-10-04 NOTE — CONSULTS
RADIATION ONCOLOGY INITIAL VISIT  10/04/22    Referring Provider: Meme Godinez MD    Chief Complaint: Right breast cancer     History of Present Illness:  Diagnosis  62 y.o. female who presented with an abnormal screening mammogram.  Bilateral screening mammogram 7/6/2022: Possible right breast architectural distortion. Right diagnostic mammogram and ultrasound 7/15/2022: 1.6 cm mass 10:00 right breast.  Ultrasound-guided core biopsy right breast mass 7/19/2022:  Right breast 10:00, 8 cm FTN: Infiltrating ductal carcinoma with lobular features, low-grade, ER/IL positive, HER2 negative. During that procedure the right axilla was also evaluated by ultrasound and showed no abnormality. Bilateral breast MRI 7/27/2022: 1.7 cm right breast mass at 10:00. No additional breast lesions or lymphadenopathy. Treatment  Right lumpectomy and sentinel lymph node biopsy 8/31/2022 by Dr. Christiano Dean: Invasive ductal carcinoma with lobular features, intermediate grade, 1.4 cm in greatest dimension. Surgical margins negative (closest 1 mm from posterior margin). 1 negative sentinel lymph node (0/1). pT1cN0  Oncotype 21  Patient reported right-sided chest pain after surgery. CT chest 9/16/2022 showed a 6 cm right breast seroma. No other abnormal findings. Arimidex started September 2022, ongoing. Current symptoms/ROS:   She is doing fairly well but has persistent right breast pain related to a seroma that developed post-op. Drainage was not recommended. She has not needed abx. She is taking Tylenol prn for pain, which helps. Feels the seroma may be getting bigger. She has started Arimidex and is tolerating it fairly well but does notice more fatigue.    Restricted ROM: no  Lymphedema: no    Pregnant: no  G0  Age at first pregnancy: n/a  FMP: 15  LMP: age 45 s/p hysterectomy for benign tumor   OCP: no  HRT: no    Prior radiation: no  Implanted cardiac devices: no  History of lupus or scleroderma: no  Family history of cancer: yes - father had colon cancer, paternal uncle had colon cancer, PGM had melanoma   Genetic testing: no.  She was referred but insurance wouldn't cover it. Meds: Reviewed. Pertinent - Arimidex 1 mg daily. Can continue all current meds during RT    Physical Examination:  Vitals:    10/04/22 1415   BP: 118/69   Pulse: 54   Temp: 97.7 °F (36.5 °C)   TempSrc: Oral   Weight: 173 lb 4.8 oz (78.6 kg)     Pain 4/10 right breast  ECOG 1 - Strenuous physical activity restricted; fully ambulatory and able to carry out light work. Well nourished, in NAD. Alert and oriented x3. Lungs CTAB. RRR. Right breast: Lumpectomy/SLNB incision healing well. Palpable large seroma adjacent to it. No significant skin erythema or drainage but pt is tender to palpation there. No concerning palpable breast masses, skin abnormalities, or nipple discharge. Left breast: No palpable masses, skin abnormalities, or nipple discharge. No supraclavicular or axillary lymphadenopathy. ROM full in RUE. No lymphedema. Chaperoned exam by GILSON Garcia. Assessment:  Cancer Staging  Malignant neoplasm of upper-outer quadrant of right breast in female, estrogen receptor positive ([facility] [address]  Staging form: Breast, AJCC 8th Edition  - Pathologic stage from 8/31/2022: Stage IA (pT1c, pN0(sn), cM0, G2, ER+, IL+, HER2-, Oncotype DX score: 21) - Signed by Len Fermin MD on 10/4/2022  - Clinical stage from 7/19/2022: Stage IA (cT1c, cN0, cM0, G1, ER+, IL+, HER2-) - Signed by Len Fermin MD on 10/4/2022     We reviewed the diagnosis, prognosis, and treatment options in accordance with the NCCN guidelines. We discussed that adjuvant radiation therapy is recommended after breast conserving surgery as it reduces the risk of recurrence by about 2/3. There is potentially a small improvement in breast cancer mortality as well (EBCTCG meta-analysis).   Treatment would be daily Monday through Friday for about 4 weeks, including a boost to the lumpectomy cavity given the 1 mm margin and her age. We discussed the logistics, benefits, risks, and alternatives to external beam radiation therapy. Prior to the initiation of treatment, a CT simulation is performed for planning purposes. We reviewed the potential side effects of a course of external beam radiation therapy to the right breast, including but not limited to acute: fatigue, skin erythema/hyperpigmentation, desquamation, breast pain/tenderness, and long term: persistent skin changes, breast fibrosis/shrinkage, lymphedema, rib fracture, lung inflammation/fibrosis, liver damage, and secondary malignancy. Patient will be assessed by me weekly while under treatment for toxicity management. The goal of treatment is curative (post-op), but there is a risk of treatment failure. Patient had the opportunity to ask questions, all were answered to her satisfaction. She understood the discussion and wanted to proceed with radiation treatment. Informed consent was obtained. Plan:  -She will start onc rehab next week for right breast seroma. Will therefore schedule CT sim for 2 weeks from now.    -If no improvement in seroma, will consider drainage. -RT plan: 42.56 Gy in 16 fx 3D-CRT right breast + cavity boost of 10 Gy in 4 fx  -Continue Arimidex as per Dr. Riley Valverde  -The patient has a documented plan of care to address pain. Pain is controlled on current regimen.     Haris Velazquez MD  10/04/22     I spent a total of 65 minutes today performing the following care related activities for Bessy Fajardo:  Face to face exam/evaluation, /Educate Patient/Caregivers, Pre-Charting/Documenting after the visit, and Interpreting/Ordering Meds, Tests, Procedures

## 2022-10-13 ENCOUNTER — HOSPITAL ENCOUNTER (OUTPATIENT)
Dept: RADIATION ONCOLOGY | Age: 57
Setting detail: RECURRING SERIES
Discharge: HOME | End: 2022-10-16
Payer: COMMERCIAL

## 2022-10-13 DIAGNOSIS — L03.313 CELLULITIS OF CHEST WALL: Primary | ICD-10-CM

## 2022-10-13 PROCEDURE — 77290 THER RAD SIMULAJ FIELD CPLX: CPT

## 2022-10-13 PROCEDURE — 77332 RADIATION TREATMENT AID(S): CPT

## 2022-10-13 RX ORDER — SULFAMETHOXAZOLE AND TRIMETHOPRIM 800; 160 MG/1; MG/1
1 TABLET ORAL 2 TIMES DAILY
Qty: 10 TABLET | Refills: 0 | Status: SHIPPED | OUTPATIENT
Start: 2022-10-13 | End: 2022-10-18

## 2022-10-13 NOTE — PROGRESS NOTES
Pt called complaining of worsening right breast pain and swelling. We brought her in for CT sim today. On exam, there is faint erythema right breast, no exudate. It is mildly enlarged relative to the left. We obtained a CT scan for planning, and the seroma has actually decreased in size considerably compared to the prior CT scan. Given her ongoing pain, I will prescribe an empiric short course of antibiotic to clear up any possible infection. Meanwhile we will proceed with RT planning and will aim to start her treatment in about 1-2 weeks.      Lakeisha Khalil MD  10/13/2022

## 2022-10-18 ENCOUNTER — APPOINTMENT (OUTPATIENT)
Dept: RADIATION ONCOLOGY | Age: 57
End: 2022-10-18
Payer: COMMERCIAL

## 2022-10-20 ENCOUNTER — HOSPITAL ENCOUNTER (OUTPATIENT)
Dept: RADIATION ONCOLOGY | Age: 57
Setting detail: RECURRING SERIES
Discharge: HOME | End: 2022-10-23
Payer: COMMERCIAL

## 2022-10-20 PROCEDURE — 77334 RADIATION TREATMENT AID(S): CPT

## 2022-10-20 PROCEDURE — 77295 3-D RADIOTHERAPY PLAN: CPT

## 2022-10-20 PROCEDURE — 77300 RADIATION THERAPY DOSE PLAN: CPT

## 2022-10-25 ENCOUNTER — TELEMEDICINE (OUTPATIENT)
Dept: INTERNAL MEDICINE CLINIC | Facility: CLINIC | Age: 57
End: 2022-10-25
Payer: COMMERCIAL

## 2022-10-25 ENCOUNTER — APPOINTMENT (OUTPATIENT)
Dept: RADIATION ONCOLOGY | Age: 57
End: 2022-10-25
Payer: COMMERCIAL

## 2022-10-25 DIAGNOSIS — R05.1 ACUTE COUGH: Primary | ICD-10-CM

## 2022-10-25 PROCEDURE — 99213 OFFICE O/P EST LOW 20 MIN: CPT | Performed by: NURSE PRACTITIONER

## 2022-10-25 RX ORDER — GUAIFENESIN AND CODEINE PHOSPHATE 100; 10 MG/5ML; MG/5ML
5 SOLUTION ORAL 3 TIMES DAILY PRN
Qty: 75 ML | Refills: 0 | Status: SHIPPED | OUTPATIENT
Start: 2022-10-25 | End: 2022-10-30

## 2022-10-25 SDOH — HEALTH STABILITY: PHYSICAL HEALTH: ON AVERAGE, HOW MANY MINUTES DO YOU ENGAGE IN EXERCISE AT THIS LEVEL?: 30 MIN

## 2022-10-25 SDOH — HEALTH STABILITY: PHYSICAL HEALTH: ON AVERAGE, HOW MANY DAYS PER WEEK DO YOU ENGAGE IN MODERATE TO STRENUOUS EXERCISE (LIKE A BRISK WALK)?: 1 DAY

## 2022-10-25 ASSESSMENT — ENCOUNTER SYMPTOMS
DIARRHEA: 0
WHEEZING: 0
COUGH: 1
SHORTNESS OF BREATH: 0

## 2022-10-25 NOTE — LETTER
RAHUL Reed  [facility] INTERNAL MEDICINE  [address]  [address] [address]  Phone: [phone]  Fax: [phone]        10/25/22     Jamarcus Mello   1965   [address] [facility] [address]     To Whom It May Concern:      Ms Leighton Ham is under our care and will be out of work 10/24-26. May return to work on 10/27.              Sincerely,    RAHUL Reed

## 2022-10-25 NOTE — PROGRESS NOTES
Virtual Visit  Chief Complaint   Patient presents with    Cough         HPI    Cough: Malaise for 2 days with chills and body aches. Constant cough. No wheezing or dyspnea. Taking Tylenol but no measured fever. Dry cough. No home Covid test. Vaccinated for flu (mid September)      Past Medical History, Past Surgical History, Family history, Social History, and Medications were all reviewed and updated as necessary. Current Outpatient Medications   Medication Sig Dispense Refill    guaiFENesin-codeine (TUSSI-ORGANIDIN NR) 100-10 MG/5ML syrup Take 5 mLs by mouth 3 times daily as needed for Cough for up to 5 days. 75 mL 0    zolpidem (AMBIEN) 10 MG tablet TAKE 1 TABLET BY MOUTH NIGHTLY AS NEEDED FOR SLEEP FOR UP TO 30 DAYS      anastrozole (ARIMIDEX) 1 MG tablet Take 1 tablet by mouth daily 30 tablet 3    citalopram (CELEXA) 40 MG tablet Take 1 tablet by mouth daily 90 tablet 1    buPROPion (WELLBUTRIN XL) 300 MG extended release tablet Take 1 tablet by mouth once daily 90 tablet 1    acyclovir (ZOVIRAX) 400 MG tablet Take 400 mg by mouth 3 times daily      busPIRone (BUSPAR) 10 MG tablet Take 10 mg by mouth 2 times daily      fluticasone (FLONASE) 50 MCG/ACT nasal spray 2 sprays by Nasal route daily PRN       No current facility-administered medications for this visit. Allergies   Allergen Reactions    Cephalexin Rash    Droperidol Other (See Comments)     Seizure  Seizure      Penicillins Rash    Aripiprazole Other (See Comments)     Bad reaction     Patient Active Problem List   Diagnosis    Depression    Anxiety    Gastroesophageal reflux disease without esophagitis    Swelling    Malignant neoplasm of upper-outer quadrant of right breast in female, estrogen receptor positive ([facility] [address]       ASSESSMENT and PLAN    Bessy was seen today for cough. Diagnoses and all orders for this visit:    Acute cough  -     guaiFENesin-codeine (TUSSI-ORGANIDIN NR) 100-10 MG/5ML syrup;  Take 5 mLs by mouth 3 times daily as needed for Cough for up to 5 days. Med and SE reviewed. Will check home Covid test and, if negative, will have flu test tomorrow at urgent care since she could receive Tamiflu. Will send [employer] message with results. Tylenol and suppress cough with Robitussin AC    FOLLOW UP    Return if symptoms worsen or fail to improve. REVIEW OF SYSTEMS    Review of Systems   Constitutional:  Positive for chills, fatigue and fever. HENT:  Positive for congestion. Respiratory:  Positive for cough. Negative for shortness of breath and wheezing. Cardiovascular:  Negative for chest pain. Gastrointestinal:  Negative for diarrhea. Musculoskeletal:  Positive for myalgias. Negative for neck stiffness. PHYSICAL EXAM    There were no vitals taken for this visit. Physical Exam  Vitals reviewed. Constitutional:       Appearance: Normal appearance. She is not ill-appearing. Pulmonary:      Effort: Pulmonary effort is normal.   Neurological:      Mental Status: She is alert and oriented to person, place, and time. Psychiatric:         Mood and Affect: Mood normal.         Thought Content: Thought content normal.        Medical problems and test results were reviewed with the patient today. Jessie Chun was evaluated through a synchronous (real-time) virtual platform audio-video encounter. Jessie Chun (and/or their health care decision maker) is aware that this is a billable service, which includes applicable co-pays and coverage as determined by their insurance carrier. Verbal consent was obtained and patient identification was verified. This visit was conducted pursuant to the emergency declaration under the [address] [place] [address], [address] [place] [address] authority and the [employer] and [employer] Act. A caregiver was present when appropriate. Ability to conduct a physical exam was limited.  The patient was located at home in the state where the provider is licensed to provide care.

## 2022-10-26 ENCOUNTER — APPOINTMENT (OUTPATIENT)
Dept: RADIATION ONCOLOGY | Age: 57
End: 2022-10-26
Payer: COMMERCIAL

## 2022-10-27 ENCOUNTER — APPOINTMENT (OUTPATIENT)
Dept: RADIATION ONCOLOGY | Age: 57
End: 2022-10-27
Payer: COMMERCIAL

## 2022-10-28 ENCOUNTER — APPOINTMENT (OUTPATIENT)
Dept: RADIATION ONCOLOGY | Age: 57
End: 2022-10-28
Payer: COMMERCIAL

## 2022-10-31 ENCOUNTER — APPOINTMENT (OUTPATIENT)
Dept: RADIATION ONCOLOGY | Age: 57
End: 2022-10-31
Payer: COMMERCIAL

## 2022-11-01 ENCOUNTER — APPOINTMENT (OUTPATIENT)
Dept: RADIATION ONCOLOGY | Age: 57
End: 2022-11-01
Payer: COMMERCIAL

## 2022-11-02 ENCOUNTER — APPOINTMENT (OUTPATIENT)
Dept: RADIATION ONCOLOGY | Age: 57
End: 2022-11-02
Payer: COMMERCIAL

## 2022-11-02 ENCOUNTER — HOSPITAL ENCOUNTER (OUTPATIENT)
Dept: RADIATION ONCOLOGY | Age: 57
Setting detail: RECURRING SERIES
Discharge: HOME | End: 2022-11-05
Payer: COMMERCIAL

## 2022-11-02 PROCEDURE — 77412 RADIATION TX DELIVERY LVL 3: CPT

## 2022-11-02 PROCEDURE — 77280 THER RAD SIMULAJ FIELD SMPL: CPT

## 2022-11-03 ENCOUNTER — CLINICAL DOCUMENTATION (OUTPATIENT)
Dept: RADIATION ONCOLOGY | Age: 57
End: 2022-11-03

## 2022-11-03 ENCOUNTER — APPOINTMENT (OUTPATIENT)
Dept: RADIATION ONCOLOGY | Age: 57
End: 2022-11-03
Payer: COMMERCIAL

## 2022-11-03 NOTE — PROGRESS NOTES
Patient received her first radiation treatment yesterday. There was some delay in her starting treatment as she was sick and kept postponing the start date. Today she informed the therapists that she did not want to continue with radiation treatment. I called and spoke to the patient. She explained that she is having difficulties with taking time off work to come for daily treatments. I explained that we can send her to social work and help fill out paperwork so that she can take the time she needs to come in for her treatments while still working. She stated she would not be paid for that time and could not afford to lose those hours. I explained that by not doing the radiation, her recurrence risk may be as high as 25-30% at 5 years (NSABP B-06). She understood but did not want to continue the radiation. I have discussed the importance of compliance with adjuvant endocrine therapy and close interval follow-up by omitting radiation. She expressed understanding. She will follow-up with med onc (appt 12/23/2022) and may follow-up here as needed. She has our contact information.       Joel Mays MD  11/3/2022

## 2022-11-04 ENCOUNTER — APPOINTMENT (OUTPATIENT)
Dept: RADIATION ONCOLOGY | Age: 57
End: 2022-11-04
Payer: COMMERCIAL

## 2022-11-07 ENCOUNTER — APPOINTMENT (OUTPATIENT)
Dept: RADIATION ONCOLOGY | Age: 57
End: 2022-11-07
Payer: COMMERCIAL

## 2022-11-08 ENCOUNTER — APPOINTMENT (OUTPATIENT)
Dept: RADIATION ONCOLOGY | Age: 57
End: 2022-11-08
Payer: COMMERCIAL

## 2022-11-09 ENCOUNTER — APPOINTMENT (OUTPATIENT)
Dept: RADIATION ONCOLOGY | Age: 57
End: 2022-11-09
Payer: COMMERCIAL

## 2022-11-10 ENCOUNTER — APPOINTMENT (OUTPATIENT)
Dept: RADIATION ONCOLOGY | Age: 57
End: 2022-11-10
Payer: COMMERCIAL

## 2022-11-11 ENCOUNTER — APPOINTMENT (OUTPATIENT)
Dept: RADIATION ONCOLOGY | Age: 57
End: 2022-11-11
Payer: COMMERCIAL

## 2022-11-14 ENCOUNTER — APPOINTMENT (OUTPATIENT)
Dept: RADIATION ONCOLOGY | Age: 57
End: 2022-11-14
Payer: COMMERCIAL

## 2022-11-15 ENCOUNTER — APPOINTMENT (OUTPATIENT)
Dept: RADIATION ONCOLOGY | Age: 57
End: 2022-11-15
Payer: COMMERCIAL

## 2022-11-16 ENCOUNTER — APPOINTMENT (OUTPATIENT)
Dept: RADIATION ONCOLOGY | Age: 57
End: 2022-11-16
Payer: COMMERCIAL

## 2022-11-17 ENCOUNTER — APPOINTMENT (OUTPATIENT)
Dept: RADIATION ONCOLOGY | Age: 57
End: 2022-11-17
Payer: COMMERCIAL

## 2022-11-18 ENCOUNTER — APPOINTMENT (OUTPATIENT)
Dept: RADIATION ONCOLOGY | Age: 57
End: 2022-11-18
Payer: COMMERCIAL

## 2022-11-20 ENCOUNTER — APPOINTMENT (OUTPATIENT)
Dept: RADIATION ONCOLOGY | Age: 57
End: 2022-11-20
Payer: COMMERCIAL

## 2022-11-21 ENCOUNTER — APPOINTMENT (OUTPATIENT)
Dept: RADIATION ONCOLOGY | Age: 57
End: 2022-11-21
Payer: COMMERCIAL

## 2022-11-22 ENCOUNTER — APPOINTMENT (OUTPATIENT)
Dept: RADIATION ONCOLOGY | Age: 57
End: 2022-11-22
Payer: COMMERCIAL

## 2022-11-23 ENCOUNTER — APPOINTMENT (OUTPATIENT)
Dept: RADIATION ONCOLOGY | Age: 57
End: 2022-11-23
Payer: COMMERCIAL

## 2022-11-27 ENCOUNTER — APPOINTMENT (OUTPATIENT)
Dept: RADIATION ONCOLOGY | Age: 57
End: 2022-11-27
Payer: COMMERCIAL

## 2022-11-28 ENCOUNTER — APPOINTMENT (OUTPATIENT)
Dept: RADIATION ONCOLOGY | Age: 57
End: 2022-11-28
Payer: COMMERCIAL

## 2022-11-29 ENCOUNTER — APPOINTMENT (OUTPATIENT)
Dept: RADIATION ONCOLOGY | Age: 57
End: 2022-11-29
Payer: COMMERCIAL

## 2022-11-30 ENCOUNTER — APPOINTMENT (OUTPATIENT)
Dept: RADIATION ONCOLOGY | Age: 57
End: 2022-11-30
Payer: COMMERCIAL

## 2022-12-19 DIAGNOSIS — F32.A DEPRESSION, UNSPECIFIED DEPRESSION TYPE: Primary | ICD-10-CM

## 2022-12-21 ENCOUNTER — TELEPHONE (OUTPATIENT)
Dept: ONCOLOGY | Age: 57
End: 2022-12-21

## 2022-12-22 DIAGNOSIS — F32.A DEPRESSION, UNSPECIFIED DEPRESSION TYPE: Primary | ICD-10-CM

## 2022-12-22 RX ORDER — BUSPIRONE HYDROCHLORIDE 10 MG/1
10 TABLET ORAL 2 TIMES DAILY
Qty: 180 TABLET | Refills: 1 | Status: SHIPPED | OUTPATIENT
Start: 2022-12-22

## 2022-12-22 RX ORDER — BUPROPION HYDROCHLORIDE 300 MG/1
300 TABLET ORAL EVERY MORNING
Qty: 90 TABLET | Refills: 1 | Status: SHIPPED | OUTPATIENT
Start: 2022-12-22

## 2022-12-22 RX ORDER — CITALOPRAM 40 MG/1
40 TABLET ORAL DAILY
Qty: 90 TABLET | Refills: 1 | Status: SHIPPED | OUTPATIENT
Start: 2022-12-22

## 2022-12-22 NOTE — TELEPHONE ENCOUNTER
Called pt. No answer lvm for pt w new appointment date/time. Asked for call or mychart message to confirm receipt of this message and/or r/s appointment if it didn't work for her schedule.  SAMANTHA

## 2022-12-22 NOTE — TELEPHONE ENCOUNTER
I called pt to let her know Sital just sent in her Wellbutrin 300 mg and also her Celexa today and pt then said she also needs a refill on her Buspar 10 mg BID

## 2022-12-22 NOTE — TELEPHONE ENCOUNTER
----- Message from Fozia Mac sent at 12/21/2022  8:39 PM EST -----  Regarding: Refills  Hi. .  I sent a refill request on Monday and never heard back from anyone. I need my Buproprion and Wellbutrin. Thank you. Terri Doherty

## 2022-12-26 DIAGNOSIS — G47.00 INSOMNIA, UNSPECIFIED TYPE: Primary | ICD-10-CM

## 2022-12-28 DIAGNOSIS — G47.00 INSOMNIA, UNSPECIFIED TYPE: ICD-10-CM

## 2022-12-28 RX ORDER — ZOLPIDEM TARTRATE 10 MG/1
10 TABLET ORAL NIGHTLY PRN
Qty: 30 TABLET | Refills: 3 | Status: SHIPPED | OUTPATIENT
Start: 2022-12-28 | End: 2023-04-27

## 2022-12-29 RX ORDER — ZOLPIDEM TARTRATE 10 MG/1
10 TABLET ORAL NIGHTLY PRN
Qty: 30 TABLET | Refills: 3 | OUTPATIENT
Start: 2022-12-29 | End: 2023-04-28

## 2023-01-03 DIAGNOSIS — G47.00 INSOMNIA, UNSPECIFIED TYPE: ICD-10-CM

## 2023-01-03 RX ORDER — ZOLPIDEM TARTRATE 10 MG/1
10 TABLET ORAL NIGHTLY PRN
Qty: 30 TABLET | Refills: 3 | OUTPATIENT
Start: 2023-01-03 | End: 2023-05-03

## 2023-01-03 NOTE — TELEPHONE ENCOUNTER
Regarding: Refill  ----- Message from Erika Delgado sent at 1/3/2023  1:31 PM EST -----       ----- Message from Fili Hagen to BERTRAND Delgado sent at 1/3/2023  1:28 AM -----   I have once again requested a refill with no results. I'm not understanding because it's always been prompt beforehand. Please send my refill request to my pharmacy. I also wanted Sital to know that Tasha's Mom    ( my Mother in [place]) passed away on the 20th of December. It's been a hard holiday season.   Thank you,  Bessy

## 2023-01-06 ENCOUNTER — TELEPHONE (OUTPATIENT)
Dept: ONCOLOGY | Age: 58
End: 2023-01-06

## 2023-01-06 RX ORDER — ACYCLOVIR 400 MG/1
TABLET ORAL
Qty: 270 TABLET | Refills: 0 | Status: SHIPPED | OUTPATIENT
Start: 2023-01-06

## 2023-01-06 NOTE — TELEPHONE ENCOUNTER
Pt called to rs her appt on 1/10 due to getting a new job and having to go out of town for training next week.

## 2023-01-18 DIAGNOSIS — C50.411 MALIGNANT NEOPLASM OF UPPER-OUTER QUADRANT OF RIGHT BREAST IN FEMALE, ESTROGEN RECEPTOR POSITIVE (HCC): Primary | ICD-10-CM

## 2023-01-18 DIAGNOSIS — Z17.0 MALIGNANT NEOPLASM OF UPPER-OUTER QUADRANT OF RIGHT BREAST IN FEMALE, ESTROGEN RECEPTOR POSITIVE (HCC): Primary | ICD-10-CM

## 2023-02-15 DIAGNOSIS — Z17.0 MALIGNANT NEOPLASM OF UPPER-OUTER QUADRANT OF RIGHT BREAST IN FEMALE, ESTROGEN RECEPTOR POSITIVE (HCC): ICD-10-CM

## 2023-02-15 DIAGNOSIS — C50.411 MALIGNANT NEOPLASM OF UPPER-OUTER QUADRANT OF RIGHT BREAST IN FEMALE, ESTROGEN RECEPTOR POSITIVE (HCC): ICD-10-CM

## 2023-02-15 RX ORDER — ANASTROZOLE 1 MG/1
1 TABLET ORAL DAILY
Qty: 30 TABLET | Refills: 0 | Status: SHIPPED | OUTPATIENT
Start: 2023-02-15

## 2023-03-07 RX ORDER — ACYCLOVIR 400 MG/1
400 TABLET ORAL 3 TIMES DAILY
Qty: 270 TABLET | Refills: 1 | Status: SHIPPED | OUTPATIENT
Start: 2023-03-07

## 2023-03-21 ENCOUNTER — OFFICE VISIT (OUTPATIENT)
Dept: INTERNAL MEDICINE CLINIC | Facility: CLINIC | Age: 58
End: 2023-03-21
Payer: COMMERCIAL

## 2023-03-21 VITALS
BODY MASS INDEX: 26.07 KG/M2 | WEIGHT: 172 LBS | DIASTOLIC BLOOD PRESSURE: 72 MMHG | HEIGHT: 68 IN | SYSTOLIC BLOOD PRESSURE: 114 MMHG

## 2023-03-21 DIAGNOSIS — M26.622 ARTHRALGIA OF LEFT TEMPOROMANDIBULAR JOINT: ICD-10-CM

## 2023-03-21 DIAGNOSIS — H61.22 IMPACTED CERUMEN OF LEFT EAR: ICD-10-CM

## 2023-03-21 PROCEDURE — 99213 OFFICE O/P EST LOW 20 MIN: CPT | Performed by: INTERNAL MEDICINE

## 2023-03-21 RX ORDER — PREDNISONE 5 MG/1
TABLET ORAL
Qty: 21 EACH | Refills: 0 | Status: SHIPPED | OUTPATIENT
Start: 2023-03-21

## 2023-03-21 ASSESSMENT — PATIENT HEALTH QUESTIONNAIRE - PHQ9
SUM OF ALL RESPONSES TO PHQ9 QUESTIONS 1 & 2: 0
SUM OF ALL RESPONSES TO PHQ QUESTIONS 1-9: 0
1. LITTLE INTEREST OR PLEASURE IN DOING THINGS: 0
2. FEELING DOWN, DEPRESSED OR HOPELESS: 0

## 2023-03-21 NOTE — PROGRESS NOTES
She was eating a sandwich in [place],  and then noted a popping and pain in her left ear. She has not had this problem before                                                      Past Medical History, Past Surgical History, Family history, Social History, and Medications were all reviewed with the patient today and updated as necessary. Current Outpatient Medications   Medication Sig Dispense Refill    predniSONE 5 MG (21) TBPK Take as directed 21 each 0    acyclovir (ZOVIRAX) 400 MG tablet Take 1 tablet by mouth 3 times daily 270 tablet 1    anastrozole (ARIMIDEX) 1 MG tablet Take 1 tablet by mouth daily 30 tablet 0    zolpidem (AMBIEN) 10 MG tablet Take 1 tablet by mouth nightly as needed for Sleep for up to 120 days. Max Daily Amount: 10 mg 30 tablet 3    buPROPion (WELLBUTRIN XL) 300 MG extended release tablet Take 1 tablet by mouth every morning 90 tablet 1    citalopram (CELEXA) 40 MG tablet Take 1 tablet by mouth daily 90 tablet 1    busPIRone (BUSPAR) 10 MG tablet Take 1 tablet by mouth 2 times daily 180 tablet 1    fluticasone (FLONASE) 50 MCG/ACT nasal spray 2 sprays by Nasal route daily PRN       No current facility-administered medications for this visit. Allergies   Allergen Reactions    Cephalexin Rash    Droperidol Other (See Comments)     Seizure  Seizure      Penicillins Rash    Aripiprazole Other (See Comments)     Bad reaction     Patient Active Problem List   Diagnosis    Depression    Anxiety    Gastroesophageal reflux disease without esophagitis    Swelling    Malignant neoplasm of upper-outer quadrant of right breast in female, estrogen receptor positive ([address]    Impacted cerumen of left ear    Arthralgia of left temporomandibular joint         Review of Systems   HENT:  Negative for ear discharge, ear pain and hearing loss.          Ear fullness    Left jaw pain under ear       OBJECTIVE:  /72   Ht 5' 8\" (1.727 m)   Wt 172 lb (78 kg)   BMI 26.15 kg/m²      Physical

## 2023-05-12 DIAGNOSIS — G47.00 INSOMNIA, UNSPECIFIED TYPE: Primary | ICD-10-CM

## 2023-05-12 RX ORDER — ZOLPIDEM TARTRATE 10 MG/1
TABLET ORAL
Qty: 30 TABLET | Refills: 0 | Status: SHIPPED | OUTPATIENT
Start: 2023-05-12 | End: 2023-06-11

## 2023-05-24 DIAGNOSIS — Z00.00 ENCOUNTER FOR GENERAL ADULT MEDICAL EXAMINATION WITHOUT ABNORMAL FINDINGS: ICD-10-CM

## 2023-05-24 LAB
ALBUMIN SERPL-MCNC: 4.2 G/DL (ref 3.5–5)
ALBUMIN/GLOB SERPL: 2.2 (ref 0.4–1.6)
ALP SERPL-CCNC: 60 U/L (ref 50–136)
ALT SERPL-CCNC: 23 U/L (ref 12–65)
ANION GAP SERPL CALC-SCNC: 5 MMOL/L (ref 2–11)
AST SERPL-CCNC: 14 U/L (ref 15–37)
BASOPHILS # BLD: 0 K/UL (ref 0–0.2)
BASOPHILS NFR BLD: 1 % (ref 0–2)
BILIRUB SERPL-MCNC: 0.4 MG/DL (ref 0.2–1.1)
BUN SERPL-MCNC: 25 MG/DL (ref 6–23)
CALCIUM SERPL-MCNC: 8.9 MG/DL (ref 8.3–10.4)
CHLORIDE SERPL-SCNC: 110 MMOL/L (ref 101–110)
CHOLEST SERPL-MCNC: 191 MG/DL
CO2 SERPL-SCNC: 27 MMOL/L (ref 21–32)
CREAT SERPL-MCNC: 0.9 MG/DL (ref 0.6–1)
DIFFERENTIAL METHOD BLD: ABNORMAL
EOSINOPHIL # BLD: 0.2 K/UL (ref 0–0.8)
EOSINOPHIL NFR BLD: 5 % (ref 0.5–7.8)
ERYTHROCYTE [DISTWIDTH] IN BLOOD BY AUTOMATED COUNT: 12.1 % (ref 11.9–14.6)
GLOBULIN SER CALC-MCNC: 1.9 G/DL (ref 2.8–4.5)
GLUCOSE SERPL-MCNC: 100 MG/DL (ref 65–100)
HCT VFR BLD AUTO: 41.6 % (ref 35.8–46.3)
HDLC SERPL-MCNC: 60 MG/DL (ref 40–60)
HDLC SERPL: 3.2
HGB BLD-MCNC: 13.5 G/DL (ref 11.7–15.4)
IMM GRANULOCYTES # BLD AUTO: 0 K/UL (ref 0–0.5)
IMM GRANULOCYTES NFR BLD AUTO: 0 % (ref 0–5)
LDLC SERPL CALC-MCNC: 114 MG/DL
LYMPHOCYTES # BLD: 1 K/UL (ref 0.5–4.6)
LYMPHOCYTES NFR BLD: 28 % (ref 13–44)
MCH RBC QN AUTO: 32.3 PG (ref 26.1–32.9)
MCHC RBC AUTO-ENTMCNC: 32.5 G/DL (ref 31.4–35)
MCV RBC AUTO: 99.5 FL (ref 82–102)
MONOCYTES # BLD: 0.3 K/UL (ref 0.1–1.3)
MONOCYTES NFR BLD: 8 % (ref 4–12)
NEUTS SEG # BLD: 2.2 K/UL (ref 1.7–8.2)
NEUTS SEG NFR BLD: 58 % (ref 43–78)
NRBC # BLD: 0 K/UL (ref 0–0.2)
PLATELET # BLD AUTO: 254 K/UL (ref 150–450)
PMV BLD AUTO: 10.1 FL (ref 9.4–12.3)
POTASSIUM SERPL-SCNC: 5.6 MMOL/L (ref 3.5–5.1)
PROT SERPL-MCNC: 6.1 G/DL (ref 6.3–8.2)
RBC # BLD AUTO: 4.18 M/UL (ref 4.05–5.2)
SODIUM SERPL-SCNC: 142 MMOL/L (ref 133–143)
TRIGL SERPL-MCNC: 85 MG/DL (ref 35–150)
TSH, 3RD GENERATION: 0.71 UIU/ML (ref 0.36–3.74)
VLDLC SERPL CALC-MCNC: 17 MG/DL (ref 6–23)
WBC # BLD AUTO: 3.7 K/UL (ref 4.3–11.1)

## 2023-05-25 ENCOUNTER — OFFICE VISIT (OUTPATIENT)
Dept: INTERNAL MEDICINE CLINIC | Facility: CLINIC | Age: 58
End: 2023-05-25
Payer: COMMERCIAL

## 2023-05-25 VITALS
BODY MASS INDEX: 25.31 KG/M2 | SYSTOLIC BLOOD PRESSURE: 94 MMHG | WEIGHT: 167 LBS | DIASTOLIC BLOOD PRESSURE: 68 MMHG | HEIGHT: 68 IN

## 2023-05-25 DIAGNOSIS — Z00.00 ROUTINE GENERAL MEDICAL EXAMINATION AT HEALTH CARE FACILITY: Primary | ICD-10-CM

## 2023-05-25 DIAGNOSIS — M25.531 RIGHT WRIST PAIN: ICD-10-CM

## 2023-05-25 DIAGNOSIS — G47.00 INSOMNIA, UNSPECIFIED TYPE: ICD-10-CM

## 2023-05-25 DIAGNOSIS — Z17.0 MALIGNANT NEOPLASM OF UPPER-OUTER QUADRANT OF RIGHT BREAST IN FEMALE, ESTROGEN RECEPTOR POSITIVE (HCC): ICD-10-CM

## 2023-05-25 DIAGNOSIS — C50.411 MALIGNANT NEOPLASM OF UPPER-OUTER QUADRANT OF RIGHT BREAST IN FEMALE, ESTROGEN RECEPTOR POSITIVE (HCC): ICD-10-CM

## 2023-05-25 DIAGNOSIS — N32.81 OAB (OVERACTIVE BLADDER): ICD-10-CM

## 2023-05-25 DIAGNOSIS — F32.A DEPRESSION, UNSPECIFIED DEPRESSION TYPE: ICD-10-CM

## 2023-05-25 DIAGNOSIS — R35.0 URINARY FREQUENCY: ICD-10-CM

## 2023-05-25 PROBLEM — M18.11 PRIMARY OSTEOARTHRITIS OF FIRST CARPOMETACARPAL JOINT OF RIGHT HAND: Status: ACTIVE | Noted: 2023-05-16

## 2023-05-25 LAB
BILIRUBIN, URINE, POC: NEGATIVE
BLOOD URINE, POC: NEGATIVE
GLUCOSE URINE, POC: NEGATIVE
KETONES, URINE, POC: NEGATIVE
LEUKOCYTE ESTERASE, URINE, POC: NEGATIVE
NITRITE, URINE, POC: NEGATIVE
PH, URINE, POC: 6.5 (ref 4.6–8)
PROTEIN,URINE, POC: NEGATIVE
SPECIFIC GRAVITY, URINE, POC: 1.01 (ref 1–1.03)
URINALYSIS CLARITY, POC: NORMAL
URINALYSIS COLOR, POC: YELLOW
UROBILINOGEN, POC: NORMAL

## 2023-05-25 PROCEDURE — 99396 PREV VISIT EST AGE 40-64: CPT | Performed by: PHYSICIAN ASSISTANT

## 2023-05-25 PROCEDURE — 81002 URINALYSIS NONAUTO W/O SCOPE: CPT | Performed by: PHYSICIAN ASSISTANT

## 2023-05-25 RX ORDER — CIPROFLOXACIN 500 MG/1
500 TABLET, FILM COATED ORAL 2 TIMES DAILY
Qty: 6 TABLET | Refills: 0 | Status: SHIPPED | OUTPATIENT
Start: 2023-05-25 | End: 2023-05-28

## 2023-05-25 RX ORDER — CITALOPRAM 40 MG/1
40 TABLET ORAL DAILY
Qty: 30 TABLET | Refills: 5 | Status: SHIPPED | OUTPATIENT
Start: 2023-05-25

## 2023-05-25 RX ORDER — BUPROPION HYDROCHLORIDE 300 MG/1
300 TABLET ORAL EVERY MORNING
Qty: 30 TABLET | Refills: 5 | Status: SHIPPED | OUTPATIENT
Start: 2023-05-25

## 2023-05-25 RX ORDER — ZOLPIDEM TARTRATE 10 MG/1
TABLET ORAL
Qty: 30 TABLET | Refills: 5 | Status: SHIPPED | OUTPATIENT
Start: 2023-05-25 | End: 2023-11-25

## 2023-05-25 RX ORDER — ACYCLOVIR 400 MG/1
400 TABLET ORAL 3 TIMES DAILY
Qty: 90 TABLET | Refills: 5 | Status: SHIPPED | OUTPATIENT
Start: 2023-05-25

## 2023-05-25 RX ORDER — BUSPIRONE HYDROCHLORIDE 10 MG/1
10 TABLET ORAL 2 TIMES DAILY
Qty: 60 TABLET | Refills: 5 | Status: SHIPPED | OUTPATIENT
Start: 2023-05-25

## 2023-05-25 ASSESSMENT — PATIENT HEALTH QUESTIONNAIRE - PHQ9
1. LITTLE INTEREST OR PLEASURE IN DOING THINGS: 0
SUM OF ALL RESPONSES TO PHQ QUESTIONS 1-9: 0
SUM OF ALL RESPONSES TO PHQ QUESTIONS 1-9: 0
SUM OF ALL RESPONSES TO PHQ9 QUESTIONS 1 & 2: 0
SUM OF ALL RESPONSES TO PHQ QUESTIONS 1-9: 0
SUM OF ALL RESPONSES TO PHQ QUESTIONS 1-9: 0
2. FEELING DOWN, DEPRESSED OR HOPELESS: 0

## 2023-05-25 ASSESSMENT — ENCOUNTER SYMPTOMS
WHEEZING: 0
CONSTIPATION: 0
CHEST TIGHTNESS: 0
DIARRHEA: 0
NAUSEA: 0
SHORTNESS OF BREATH: 0
COUGH: 0
EYE PAIN: 0
VOMITING: 0
SORE THROAT: 0
COLOR CHANGE: 0
ABDOMINAL PAIN: 0
VOICE CHANGE: 0

## 2023-05-25 NOTE — PROGRESS NOTES
follow for bianka fx, in brace for now    OAB (overactive bladder)  -     mirabegron (MYRBETRIQ) 50 MG TB24; Take 50 mg by mouth daily    Other orders  -     acyclovir (ZOVIRAX) 400 MG tablet; Take 1 tablet by mouth 3 times daily    Discussed BMI and healthy weight and diet, weight bearing exercise, smoking avoidance, sun protection and medication compliance. The patient was counseled re screening procedures and recommended schedules for immunizations, mammography, Pap smears, GI hemoccult testing, colonoscopy and BMD.     FOLLOW UP  Return in about 3 months (around 8/25/2023) for follow up on meds .

## 2023-05-28 LAB
BACTERIA SPEC CULT: NORMAL
SERVICE CMNT-IMP: NORMAL

## 2023-06-07 ENCOUNTER — TELEPHONE (OUTPATIENT)
Dept: INTERNAL MEDICINE CLINIC | Facility: CLINIC | Age: 58
End: 2023-06-07

## 2023-06-07 DIAGNOSIS — H92.02 LEFT EAR PAIN: Primary | ICD-10-CM

## 2023-06-07 NOTE — TELEPHONE ENCOUNTER
Pt was last seen 3/21 23 for left ear pain and wax removal. At that time note said to call if still having issues Ok for referral now or is another ov needed since she was last seen back in March?

## 2023-06-07 NOTE — TELEPHONE ENCOUNTER
I eft pt a voice msg and also sent her a My Chart msg letting her know referral to [facility] has been sent and I gave pt their phone # [phone].

## 2023-06-07 NOTE — TELEPHONE ENCOUNTER
----- Message from Darinelwoodrow Arellano sent at 6/6/2023 11:07 AM EDT -----  Subject: Referral Request    Reason for referral request? ENT  Provider patient wants to be referred to(if known):     Provider Phone Number(if known): Additional Information for Provider?  Patient is requesting a referral for   an ENT for an ear issue as her ear is still bothering her  ---------------------------------------------------------------------------  --------------  [address]    [phone]; OK to leave message on voicemail  ---------------------------------------------------------------------------  --------------

## 2023-06-08 ENCOUNTER — OFFICE VISIT (OUTPATIENT)
Dept: ENT CLINIC | Age: 58
End: 2023-06-08

## 2023-06-08 VITALS — WEIGHT: 172.4 LBS | BODY MASS INDEX: 26.13 KG/M2 | HEART RATE: 74 BPM | OXYGEN SATURATION: 93 % | HEIGHT: 68 IN

## 2023-06-08 DIAGNOSIS — H92.02 OTALGIA OF LEFT EAR: Primary | ICD-10-CM

## 2023-06-08 DIAGNOSIS — H61.21 EXCESSIVE EAR WAX, RIGHT: ICD-10-CM

## 2023-06-08 ASSESSMENT — ENCOUNTER SYMPTOMS
FACIAL SWELLING: 1
VOMITING: 0
EYE ITCHING: 0
SHORTNESS OF BREATH: 0
EYE REDNESS: 0

## 2023-06-08 NOTE — PROGRESS NOTES
Negative for light-headedness. Psychiatric/Behavioral:  Negative for sleep disturbance. PHYSICAL EXAM:    Pulse 74   Ht 5' 8\" (1.727 m)   Wt 172 lb 6.4 oz (78.2 kg)   SpO2 93%   BMI 26.21 kg/m²     General: NAD, well-appearing  Neuro: No gross neuro deficits. CN's II-XII intact. No facial weakness. Eyes: EOMI. Pupils reactive. No periorbital edema/ecchymosis. Skin: No facial erythema, rashes or concerning lesions. Nose: No external deviations or saddling. Intranasally, septum is midline without perforations, nasal mucosa appears healthy with no erythema, mucopurulence, or polyps. Mouth: Moist mucus membranes, normal tongue/palate mobility, no concerning mucosal lesions. Oropharynx: clear with no erythema/exudate, no tonsillar hypertrophy. Ears: Normal appearing auricles, no hematomas. EACs with right-sided cerumen impaction, tympanic membranes intact and middle ear spaces are clear. Left-sided TMJ popping  Neck: Soft, supple, no palpable lateral neck masses. No parotid or submandibular masses. No thyromegaly or palpable thyroid nodules. No surgical scars. Lymphatics: No palpable cervical LAD. Resp/Lungs: No audible stridor or wheezing, CTAB  Heart: RRR  Extremities: No clubbing or cyanosis. PROCEDURE: Cerumen removal under binocular microscopy  INDICATIONS: Cerumen impaction  DESCRIPTION: After verbal consent was obtained and a timeout was performed, the otologic microscope was used to visualize both ears. There were normal appearing auricles bilaterally. There was cerumen impaction on the right. I cleaned out the right ear under the scope w/ a right angle hook and otologic suctions. After cleaning, the ear canal skin was healthy and both TMs were intact w/ no perforations. Both middle ear spaces were clear w/ no effusions. The patient tolerated the procedure well and there were no complications.       Lab Results   Component Value Date    WBC 3.7 (L) 05/24/2023    HGB 13.5 05/24/2023

## 2023-06-17 ENCOUNTER — HOSPITAL ENCOUNTER (OUTPATIENT)
Dept: MAMMOGRAPHY | Age: 58
Discharge: HOME | End: 2023-06-20
Attending: STUDENT IN AN ORGANIZED HEALTH CARE EDUCATION/TRAINING PROGRAM
Payer: COMMERCIAL

## 2023-06-17 DIAGNOSIS — Z12.31 SCREENING MAMMOGRAM FOR BREAST CANCER: ICD-10-CM

## 2023-06-17 PROCEDURE — 77067 SCR MAMMO BI INCL CAD: CPT

## 2023-06-28 ENCOUNTER — HOSPITAL ENCOUNTER (OUTPATIENT)
Dept: MAMMOGRAPHY | Age: 58
Discharge: HOME | End: 2023-07-01
Attending: STUDENT IN AN ORGANIZED HEALTH CARE EDUCATION/TRAINING PROGRAM
Payer: COMMERCIAL

## 2023-06-28 DIAGNOSIS — R92.8 ABNORMAL MAMMOGRAM: ICD-10-CM

## 2023-06-28 PROCEDURE — 76642 ULTRASOUND BREAST LIMITED: CPT

## 2023-06-28 PROCEDURE — G0279 TOMOSYNTHESIS, MAMMO: HCPCS

## 2023-07-03 ENCOUNTER — PATIENT MESSAGE (OUTPATIENT)
Dept: INTERNAL MEDICINE CLINIC | Facility: CLINIC | Age: 58
End: 2023-07-03

## 2023-07-03 DIAGNOSIS — L98.9 SKIN LESION: Primary | ICD-10-CM

## 2023-07-03 DIAGNOSIS — Z87.898 HISTORY OF BREAST PROBLEM: ICD-10-CM

## 2023-07-05 NOTE — TELEPHONE ENCOUNTER
Galindo Romero MA 7/3/2023 9:34 AM EDT    Sital please advise   ----- Message -----  From: Lord Fuchs  Sent: 7/3/2023 7:31 AM EDT  To: , *  Subject: Referral     Good Morning. .  I need a referral for a General Surgeon in case I have to have breast surgery again. I was not a fan of Dr. Mira Sanchez as I asked her to drain a seroma and she wouldn't do it. If you read my notes from my recent mammogram you'll notice that it's still present. Her bedside manner was non existent. This was observed by Tasha and also my brother. If you could recommend someone so they could get to know me and my history just in case I do need another surgery. Thank you!!   Johny Mccullough

## 2023-07-05 NOTE — TELEPHONE ENCOUNTER
Please let pt know that would be . Does she want referral or will she let me know when she wants this referral to the general breast surgeon (Italo Hyde). Also does she want referral to derm as Mrs. Welch requested for her to the below provider? If yes, please let me know. Let me know on both and I can send referrals    To see Dr. Ok Trinidad MD.... at :  [address].   [facility],  [address]  Phone : [phone]: [phone]

## 2023-07-13 DIAGNOSIS — F32.A DEPRESSION, UNSPECIFIED DEPRESSION TYPE: ICD-10-CM

## 2023-07-14 ENCOUNTER — HOSPITAL ENCOUNTER (OUTPATIENT)
Dept: MRI IMAGING | Age: 58
Discharge: HOME | End: 2023-07-14
Payer: COMMERCIAL

## 2023-07-14 DIAGNOSIS — Z85.3 HISTORY OF BREAST CANCER: ICD-10-CM

## 2023-07-14 PROCEDURE — C8908 MRI W/O FOL W/CONT, BREAST,: HCPCS

## 2023-07-14 PROCEDURE — 6360000004 HC RX CONTRAST MEDICATION: Performed by: STUDENT IN AN ORGANIZED HEALTH CARE EDUCATION/TRAINING PROGRAM

## 2023-07-14 PROCEDURE — 2580000003 HC RX 258: Performed by: STUDENT IN AN ORGANIZED HEALTH CARE EDUCATION/TRAINING PROGRAM

## 2023-07-14 PROCEDURE — A9579 GAD-BASE MR CONTRAST NOS,1ML: HCPCS | Performed by: STUDENT IN AN ORGANIZED HEALTH CARE EDUCATION/TRAINING PROGRAM

## 2023-07-14 RX ORDER — SODIUM CHLORIDE 0.9 % (FLUSH) 0.9 %
30 SYRINGE (ML) INJECTION AS NEEDED
Status: DISCONTINUED | OUTPATIENT
Start: 2023-07-14 | End: 2023-07-18 | Stop reason: HOSPADM

## 2023-07-14 RX ORDER — CITALOPRAM 40 MG/1
TABLET ORAL
Qty: 90 TABLET | Refills: 1 | OUTPATIENT
Start: 2023-07-14

## 2023-07-14 RX ADMIN — GADOTERIDOL 15 ML: 279.3 INJECTION, SOLUTION INTRAVENOUS at 16:10

## 2023-07-14 RX ADMIN — SODIUM CHLORIDE, PRESERVATIVE FREE 30 ML: 5 INJECTION INTRAVENOUS at 16:10

## 2023-07-14 NOTE — TELEPHONE ENCOUNTER
Rx last written 5/25/23 for #30 with 5 refills. Pt has upcoming appt on 8/28/23. Pt should have enough to last until appt.

## 2023-08-12 DIAGNOSIS — F32.A DEPRESSION, UNSPECIFIED DEPRESSION TYPE: ICD-10-CM

## 2023-08-14 RX ORDER — CITALOPRAM 40 MG/1
40 TABLET ORAL DAILY
Qty: 90 TABLET | Refills: 1 | Status: SHIPPED | OUTPATIENT
Start: 2023-08-14

## 2023-08-22 DIAGNOSIS — F32.A DEPRESSION, UNSPECIFIED DEPRESSION TYPE: Primary | ICD-10-CM

## 2023-08-22 RX ORDER — BUPROPION HYDROCHLORIDE 150 MG/1
300 TABLET ORAL EVERY MORNING
COMMUNITY
End: 2023-08-22 | Stop reason: SDUPTHER

## 2023-08-22 RX ORDER — BUPROPION HYDROCHLORIDE 150 MG/1
300 TABLET ORAL EVERY MORNING
Qty: 60 TABLET | Refills: 1 | Status: SHIPPED | OUTPATIENT
Start: 2023-08-22

## 2023-08-22 NOTE — TELEPHONE ENCOUNTER
----- Message from Zoraida Maxwell sent at 8/22/2023  3:17 AM EDT -----  Regarding: Wellbutrin   Contact: [phone]  Good Morning. .. [facility] says that Wellbutrin 300mg is on back order. That have the 100 mg and asked if Sital could please call in a new prescription for 2 150 mg QD. I took my last one yesterday so could she please call it in today. Thank you!!   Rebeca Solorzano

## 2023-09-19 ENCOUNTER — OFFICE VISIT (OUTPATIENT)
Dept: INTERNAL MEDICINE CLINIC | Facility: CLINIC | Age: 58
End: 2023-09-19
Payer: COMMERCIAL

## 2023-09-19 VITALS
WEIGHT: 172 LBS | DIASTOLIC BLOOD PRESSURE: 76 MMHG | SYSTOLIC BLOOD PRESSURE: 112 MMHG | HEIGHT: 68 IN | BODY MASS INDEX: 26.07 KG/M2

## 2023-09-19 DIAGNOSIS — M79.89 SWELLING OF LOWER LEG: Primary | ICD-10-CM

## 2023-09-19 DIAGNOSIS — B00.9 HERPES SIMPLEX: ICD-10-CM

## 2023-09-19 DIAGNOSIS — G47.00 INSOMNIA, UNSPECIFIED TYPE: ICD-10-CM

## 2023-09-19 DIAGNOSIS — F32.A DEPRESSION, UNSPECIFIED DEPRESSION TYPE: ICD-10-CM

## 2023-09-19 LAB
BILIRUBIN, URINE, POC: NEGATIVE
BLOOD URINE, POC: NEGATIVE
GLUCOSE URINE, POC: NEGATIVE
KETONES, URINE, POC: NEGATIVE
LEUKOCYTE ESTERASE, URINE, POC: NEGATIVE
NITRITE, URINE, POC: NEGATIVE
PH, URINE, POC: 7.5 (ref 4.6–8)
PROTEIN,URINE, POC: NEGATIVE
SPECIFIC GRAVITY, URINE, POC: 1.01 (ref 1–1.03)
URINALYSIS CLARITY, POC: CLEAR
URINALYSIS COLOR, POC: YELLOW
UROBILINOGEN, POC: NORMAL

## 2023-09-19 PROCEDURE — 99215 OFFICE O/P EST HI 40 MIN: CPT | Performed by: PHYSICIAN ASSISTANT

## 2023-09-19 PROCEDURE — 81003 URINALYSIS AUTO W/O SCOPE: CPT | Performed by: PHYSICIAN ASSISTANT

## 2023-09-19 RX ORDER — BUSPIRONE HYDROCHLORIDE 10 MG/1
10 TABLET ORAL 2 TIMES DAILY
Qty: 60 TABLET | Refills: 5 | Status: SHIPPED | OUTPATIENT
Start: 2023-09-19

## 2023-09-19 RX ORDER — ZOLPIDEM TARTRATE 10 MG/1
TABLET ORAL
Qty: 30 TABLET | Refills: 5 | Status: SHIPPED | OUTPATIENT
Start: 2023-09-19 | End: 2024-03-21

## 2023-09-19 RX ORDER — BUPROPION HYDROCHLORIDE 300 MG/1
300 TABLET ORAL EVERY MORNING
COMMUNITY
Start: 2023-08-22

## 2023-09-19 RX ORDER — ACYCLOVIR 400 MG/1
400 TABLET ORAL 3 TIMES DAILY PRN
Qty: 90 TABLET | Refills: 1 | Status: SHIPPED | OUTPATIENT
Start: 2023-09-19

## 2023-09-19 SDOH — ECONOMIC STABILITY: FOOD INSECURITY: WITHIN THE PAST 12 MONTHS, YOU WORRIED THAT YOUR FOOD WOULD RUN OUT BEFORE YOU GOT MONEY TO BUY MORE.: NEVER TRUE

## 2023-09-19 SDOH — ECONOMIC STABILITY: FOOD INSECURITY: WITHIN THE PAST 12 MONTHS, THE FOOD YOU BOUGHT JUST DIDN'T LAST AND YOU DIDN'T HAVE MONEY TO GET MORE.: NEVER TRUE

## 2023-09-19 SDOH — ECONOMIC STABILITY: INCOME INSECURITY: HOW HARD IS IT FOR YOU TO PAY FOR THE VERY BASICS LIKE FOOD, HOUSING, MEDICAL CARE, AND HEATING?: NOT VERY HARD

## 2023-09-19 ASSESSMENT — ENCOUNTER SYMPTOMS
EYE PAIN: 0
VOMITING: 0
COUGH: 0
COLOR CHANGE: 0
WHEEZING: 0
CONSTIPATION: 0
SHORTNESS OF BREATH: 0
CHEST TIGHTNESS: 0
NAUSEA: 0
VOICE CHANGE: 0
DIARRHEA: 0
SORE THROAT: 0
ABDOMINAL PAIN: 0

## 2023-09-20 ENCOUNTER — HOSPITAL ENCOUNTER (OUTPATIENT)
Dept: ULTRASOUND IMAGING | Age: 58
Discharge: HOME | End: 2023-09-23

## 2023-09-20 DIAGNOSIS — M79.89 SWELLING OF LOWER LEG: ICD-10-CM

## 2023-12-27 ENCOUNTER — TELEPHONE (OUTPATIENT)
Dept: INTERNAL MEDICINE CLINIC | Facility: CLINIC | Age: 58
End: 2023-12-27

## 2023-12-27 DIAGNOSIS — I25.83 CORONARY ARTERY DISEASE DUE TO LIPID RICH PLAQUE: Primary | ICD-10-CM

## 2023-12-27 DIAGNOSIS — I25.10 CORONARY ARTERY DISEASE DUE TO LIPID RICH PLAQUE: Primary | ICD-10-CM

## 2024-01-03 NOTE — TELEPHONE ENCOUNTER
LMTCO to discuss her referral request to cardiology. Pt was last seen here on 9/19/23. Sital I am not sure if you spoke to her about getting this referral?

## 2024-01-04 NOTE — TELEPHONE ENCOUNTER
Pt said she was told she has coronary artery disease and she had an ECHO done back 4/2021 . Pt said if an eliza is needed to discuss she will come in but she believes this had been discussed in the past .Please advise if eliza is needed?

## 2024-01-05 NOTE — TELEPHONE ENCOUNTER
Please advise pt, I reviewed the 3/2021 OV and 4/2021 echo, referral placed to cardiology for consult

## 2024-02-14 DIAGNOSIS — F32.A DEPRESSION, UNSPECIFIED DEPRESSION TYPE: ICD-10-CM

## 2024-02-14 DIAGNOSIS — B00.9 HERPES SIMPLEX: ICD-10-CM

## 2024-02-14 DIAGNOSIS — G47.00 INSOMNIA, UNSPECIFIED TYPE: ICD-10-CM

## 2024-02-15 RX ORDER — CITALOPRAM 40 MG/1
40 TABLET ORAL DAILY
Qty: 90 TABLET | Refills: 0 | Status: SHIPPED | OUTPATIENT
Start: 2024-02-15

## 2024-02-15 RX ORDER — BUSPIRONE HYDROCHLORIDE 10 MG/1
10 TABLET ORAL 2 TIMES DAILY
Qty: 60 TABLET | Refills: 0 | Status: SHIPPED | OUTPATIENT
Start: 2024-02-15

## 2024-02-15 RX ORDER — ACYCLOVIR 400 MG/1
400 TABLET ORAL 3 TIMES DAILY PRN
Qty: 90 TABLET | Refills: 0 | Status: SHIPPED | OUTPATIENT
Start: 2024-02-15

## 2024-02-15 RX ORDER — ZOLPIDEM TARTRATE 10 MG/1
TABLET ORAL
Qty: 30 TABLET | Refills: 0 | Status: SHIPPED | OUTPATIENT
Start: 2024-02-15 | End: 2024-08-15

## 2024-03-04 ENCOUNTER — INITIAL CONSULT (OUTPATIENT)
Age: 59
End: 2024-03-04
Payer: COMMERCIAL

## 2024-03-04 VITALS
HEART RATE: 59 BPM | HEIGHT: 68 IN | WEIGHT: 167.5 LBS | SYSTOLIC BLOOD PRESSURE: 126 MMHG | DIASTOLIC BLOOD PRESSURE: 80 MMHG | BODY MASS INDEX: 25.39 KG/M2

## 2024-03-04 DIAGNOSIS — Z76.89 ESTABLISHING CARE WITH NEW DOCTOR, ENCOUNTER FOR: ICD-10-CM

## 2024-03-04 DIAGNOSIS — M79.89 LEG SWELLING: Primary | ICD-10-CM

## 2024-03-04 DIAGNOSIS — E78.00 PURE HYPERCHOLESTEROLEMIA: Chronic | ICD-10-CM

## 2024-03-04 PROCEDURE — 93000 ELECTROCARDIOGRAM COMPLETE: CPT | Performed by: INTERNAL MEDICINE

## 2024-03-04 PROCEDURE — 99244 OFF/OP CNSLTJ NEW/EST MOD 40: CPT | Performed by: INTERNAL MEDICINE

## 2024-03-04 RX ORDER — ATORVASTATIN CALCIUM 20 MG/1
20 TABLET, FILM COATED ORAL DAILY
Qty: 90 TABLET | Refills: 3 | Status: SHIPPED | OUTPATIENT
Start: 2024-03-04

## 2024-03-04 ASSESSMENT — ENCOUNTER SYMPTOMS: SHORTNESS OF BREATH: 0

## 2024-03-04 NOTE — PROGRESS NOTES
[address] [facility], [place] [address]  PHONE: [phone]    SUBJECTIVE:   Bessy Fajardo is a 58 y.o. female 1965   seen for a consultation visit regarding the following:     Chief Complaint   Patient presents with    Consultation              Consultation is requested for evaluation of Consultation   .    History of present illness: 58 y.o. female with PMH GERD, breast cancer presenting for cardiology evaluation. Patient underwent coronary CTA in 2020 which showed CAC score of 199 and no significant stenosis. She also had an echo in 2021 which was normal. She denies chest pain or dyspnea. Her only complaint is periodic leg swelling.      Past Medical History, Past Surgical History, Family history, Social History, and Medications were all reviewed with the patient today and updated as necessary.       Allergies   Allergen Reactions    Cephalexin Rash    Droperidol Other (See Comments)     Seizure  Seizure      Penicillins Rash    Aripiprazole Other (See Comments)     Bad reaction     Past Medical History:   Diagnosis Date    Anxiety     Depression     Osteoporosis     Pneumonia     Sleep disorder     Stomach ulcer     UTI (urinary tract infection)      Past Surgical History:   Procedure Laterality Date    BREAST BIOPSY Right 07/19/2022    US Core Bx    BREAST BIOPSY Right 08/31/2022    RIGHT BREAST LUMPECTOMY WITH MAG SEED 4th case [place] [facility]    BREAST BIOPSY Right 08/31/2022    RIGHT BREAST BIOPSY SENTINEL NODE DISSECTION performed by Geena Gonzales DO [place] [facility]    HYSTERECTOMY (CERVIX STATUS UNKNOWN)  2007    ORTHOPEDIC SURGERY Left 2002    $ surgeries left hand same yr    [place] UNLISTED PROCEDURE ABDOMEN PERITONEUM & OMENTUM      TOTAL KNEE ARTHROPLASTY Right 2014    US BREAST BIOPSY W LOC DEVICE 1ST LESION RIGHT Right 07/19/2022    US BREAST NEEDLE BIOPSY RIGHT 7/19/2022 Chi Heaton MD [facility]    US GUIDED NEEDLE LOC OF

## 2024-03-22 DIAGNOSIS — B00.9 HERPES SIMPLEX: ICD-10-CM

## 2024-03-22 DIAGNOSIS — F32.A DEPRESSION, UNSPECIFIED DEPRESSION TYPE: ICD-10-CM

## 2024-03-23 DIAGNOSIS — G47.00 INSOMNIA, UNSPECIFIED TYPE: ICD-10-CM

## 2024-03-23 DIAGNOSIS — F32.A DEPRESSION, UNSPECIFIED DEPRESSION TYPE: ICD-10-CM

## 2024-03-23 DIAGNOSIS — E78.00 PURE HYPERCHOLESTEROLEMIA: Chronic | ICD-10-CM

## 2024-03-23 RX ORDER — ATORVASTATIN CALCIUM 20 MG/1
20 TABLET, FILM COATED ORAL DAILY
Qty: 90 TABLET | Refills: 3 | Status: CANCELLED | OUTPATIENT
Start: 2024-03-23

## 2024-03-25 RX ORDER — BUSPIRONE HYDROCHLORIDE 10 MG/1
10 TABLET ORAL 2 TIMES DAILY
Qty: 60 TABLET | Refills: 1 | Status: SHIPPED | OUTPATIENT
Start: 2024-03-25

## 2024-03-25 RX ORDER — CITALOPRAM 40 MG/1
40 TABLET ORAL DAILY
Qty: 90 TABLET | Refills: 0 | OUTPATIENT
Start: 2024-03-25

## 2024-03-25 RX ORDER — ZOLPIDEM TARTRATE 10 MG/1
TABLET ORAL
Qty: 30 TABLET | Refills: 1 | Status: SHIPPED | OUTPATIENT
Start: 2024-03-25 | End: 2024-09-21

## 2024-03-25 RX ORDER — ZOLPIDEM TARTRATE 10 MG/1
TABLET ORAL
Qty: 30 TABLET | Refills: 0 | OUTPATIENT
Start: 2024-03-25

## 2024-03-25 RX ORDER — CITALOPRAM 40 MG/1
40 TABLET ORAL DAILY
Qty: 90 TABLET | Refills: 1 | OUTPATIENT
Start: 2024-03-25

## 2024-03-25 RX ORDER — ACYCLOVIR 400 MG/1
400 TABLET ORAL 3 TIMES DAILY
Qty: 270 TABLET | Refills: 1 | OUTPATIENT
Start: 2024-03-25

## 2024-03-25 NOTE — TELEPHONE ENCOUNTER
- Most recent A1c: 5.9  - Hold Jardiance (Also given CrCl)  - FS + LSS   Buspirone and Zolpidem both last written 2/15/24 for 30 day supply. Pt next appt is 5/24/24. Rx pended.

## 2024-03-27 DIAGNOSIS — E78.00 PURE HYPERCHOLESTEROLEMIA: Chronic | ICD-10-CM

## 2024-03-27 RX ORDER — ATORVASTATIN CALCIUM 20 MG/1
20 TABLET, FILM COATED ORAL DAILY
Qty: 90 TABLET | Refills: 3 | Status: SHIPPED | OUTPATIENT
Start: 2024-03-27

## 2024-03-27 NOTE — TELEPHONE ENCOUNTER
Requested Prescriptions     Signed Prescriptions Disp Refills    atorvastatin (LIPITOR) 20 MG tablet 90 tablet 3     Sig: Take 1 tablet by mouth daily     Authorizing Provider: DESTINY ASHLEY     Ordering User: FERNY SHIRLEY       Rx verified.

## 2024-03-27 NOTE — TELEPHONE ENCOUNTER
Requested Prescriptions     Pending Prescriptions Disp Refills    atorvastatin (LIPITOR) 20 MG tablet 90 tablet 3     Sig: Take 1 tablet by mouth daily

## 2024-04-01 ENCOUNTER — TELEMEDICINE (OUTPATIENT)
Dept: INTERNAL MEDICINE CLINIC | Facility: CLINIC | Age: 59
End: 2024-04-01
Payer: COMMERCIAL

## 2024-04-01 DIAGNOSIS — C50.411 MALIGNANT NEOPLASM OF UPPER-OUTER QUADRANT OF RIGHT BREAST IN FEMALE, ESTROGEN RECEPTOR POSITIVE (HCC): ICD-10-CM

## 2024-04-01 DIAGNOSIS — E78.2 MIXED HYPERLIPIDEMIA: ICD-10-CM

## 2024-04-01 DIAGNOSIS — Z17.0 MALIGNANT NEOPLASM OF UPPER-OUTER QUADRANT OF RIGHT BREAST IN FEMALE, ESTROGEN RECEPTOR POSITIVE (HCC): ICD-10-CM

## 2024-04-01 DIAGNOSIS — F41.9 ANXIETY: Primary | ICD-10-CM

## 2024-04-01 PROCEDURE — 99214 OFFICE O/P EST MOD 30 MIN: CPT | Performed by: PHYSICIAN ASSISTANT

## 2024-04-01 ASSESSMENT — ENCOUNTER SYMPTOMS
EYE PAIN: 0
CONSTIPATION: 0
COUGH: 0
SHORTNESS OF BREATH: 0
VOMITING: 0
DIARRHEA: 0
CHEST TIGHTNESS: 0
ABDOMINAL PAIN: 0
COLOR CHANGE: 0
WHEEZING: 0
VOICE CHANGE: 0
SORE THROAT: 0
NAUSEA: 0

## 2024-04-01 NOTE — PROGRESS NOTES
nausea and vomiting.   Genitourinary:  Negative for dysuria, frequency, hematuria and urgency.   Musculoskeletal:  Negative for arthralgias, gait problem, joint swelling and neck pain.   Skin:  Negative for color change and rash.   Neurological:  Negative for dizziness, syncope, numbness and headaches.   Hematological:  Negative for adenopathy.   Psychiatric/Behavioral:  Negative for decreased concentration, hallucinations, sleep disturbance and suicidal ideas. The patient is nervous/anxious.          OBJECTIVE:  There were no vitals taken for this visit.     PHYSICAL EXAM  Physical Exam  Constitutional:       Appearance: Normal appearance.   HENT:      Head: Normocephalic and atraumatic.      Nose: Nose normal.   Eyes:      Extraocular Movements: Extraocular movements intact.   Pulmonary:      Effort: Pulmonary effort is normal.   Musculoskeletal:         General: Normal range of motion.      Cervical back: Normal range of motion.   Skin:     Findings: No erythema or rash.   Neurological:      General: No focal deficit present.      Mental Status: She is alert and oriented to person, place, and time.   Psychiatric:         Mood and Affect: Mood normal.         Medical problems and test results were reviewed with the patient today.       ASSESSMENT and PLAN  Diagnoses and all orders for this visit:    Anxiety  Comments:  pt states not feeling like her anxiety in increased on current, will get labs.  Orders:  -     [facility] - Fede Leiva MD, Psychiatry, [place]    Malignant neoplasm of upper-outer quadrant of right breast in female, estrogen receptor positive (HCC)  -     [facility] - Fransisco Moreira MD, Hematology & Oncology, [place]    Mixed hyperlipidemia  -     CBC with Auto Differential; Future  -     Comprehensive Metabolic Panel; Future  -     Lipid Panel; Future  -     TSH; Future          Return for Please keep previous appt as scheduled CPE 5/28/2024.       4/1/2024        Bessy Fajardo, was evaluated

## 2024-04-02 ENCOUNTER — TELEPHONE (OUTPATIENT)
Dept: ONCOLOGY | Age: 59
End: 2024-04-02

## 2024-04-02 NOTE — TELEPHONE ENCOUNTER
Called patient and left vm regarding a request we received to get her a follow up appt scheduled. Provided callback # and advised of using [employer] as well.

## 2024-04-26 DIAGNOSIS — C50.411 MALIGNANT NEOPLASM OF UPPER-OUTER QUADRANT OF RIGHT BREAST IN FEMALE, ESTROGEN RECEPTOR POSITIVE (HCC): ICD-10-CM

## 2024-04-26 DIAGNOSIS — Z17.0 MALIGNANT NEOPLASM OF UPPER-OUTER QUADRANT OF RIGHT BREAST IN FEMALE, ESTROGEN RECEPTOR POSITIVE (HCC): ICD-10-CM

## 2024-04-29 RX ORDER — ANASTROZOLE 1 MG/1
1 TABLET ORAL DAILY
Qty: 30 TABLET | Refills: 1 | OUTPATIENT
Start: 2024-04-29

## 2024-05-28 DIAGNOSIS — F32.A DEPRESSION, UNSPECIFIED DEPRESSION TYPE: ICD-10-CM

## 2024-05-28 DIAGNOSIS — G47.00 INSOMNIA, UNSPECIFIED TYPE: ICD-10-CM

## 2024-05-29 DIAGNOSIS — G47.00 INSOMNIA, UNSPECIFIED TYPE: ICD-10-CM

## 2024-05-30 RX ORDER — ZOLPIDEM TARTRATE 10 MG/1
TABLET ORAL
Qty: 30 TABLET | Refills: 1 | OUTPATIENT
Start: 2024-05-30 | End: 2024-11-24

## 2024-05-30 RX ORDER — CITALOPRAM 40 MG/1
40 TABLET ORAL DAILY
Qty: 90 TABLET | Refills: 0 | Status: SHIPPED | OUTPATIENT
Start: 2024-05-30

## 2024-05-30 RX ORDER — ZOLPIDEM TARTRATE 10 MG/1
TABLET ORAL
Qty: 30 TABLET | Refills: 1 | OUTPATIENT
Start: 2024-05-30 | End: 2024-11-25

## 2024-05-30 RX ORDER — ZOLPIDEM TARTRATE 10 MG/1
TABLET ORAL
Qty: 30 TABLET | Refills: 1 | OUTPATIENT
Start: 2024-05-30

## 2024-05-30 RX ORDER — ZOLPIDEM TARTRATE 10 MG/1
TABLET ORAL
Qty: 30 TABLET | Refills: 0 | Status: SHIPPED | OUTPATIENT
Start: 2024-05-30 | End: 2024-11-25

## 2024-05-30 NOTE — TELEPHONE ENCOUNTER
Please advise. Pt last seen on 4/1/24, follow-up scheduled for 6/7/24. Rx last wrote on 3/25/24 #30 with 1 refill. Rx pended.

## 2024-05-30 NOTE — TELEPHONE ENCOUNTER
Please advise. Pt last seen on 4/1/24, follow-up scheduled for 6/7/24. Rx last wrote on 2/15/24 #90 with 0 refills. Rx pended.

## 2024-05-31 DIAGNOSIS — E78.2 MIXED HYPERLIPIDEMIA: ICD-10-CM

## 2024-05-31 LAB
ALBUMIN SERPL-MCNC: 4 G/DL (ref 3.5–5)
ALBUMIN/GLOB SERPL: 1.9 (ref 1–1.9)
ALP SERPL-CCNC: 65 U/L (ref 35–104)
ALT SERPL-CCNC: 22 U/L (ref 12–65)
ANION GAP SERPL CALC-SCNC: 10 MMOL/L (ref 9–18)
AST SERPL-CCNC: 25 U/L (ref 15–37)
BASOPHILS # BLD: 0 K/UL (ref 0–0.2)
BASOPHILS NFR BLD: 1 % (ref 0–2)
BILIRUB SERPL-MCNC: 0.5 MG/DL (ref 0–1.2)
BUN SERPL-MCNC: 15 MG/DL (ref 6–23)
CALCIUM SERPL-MCNC: 9.4 MG/DL (ref 8.8–10.2)
CHLORIDE SERPL-SCNC: 107 MMOL/L (ref 98–107)
CHOLEST SERPL-MCNC: 195 MG/DL (ref 0–200)
CO2 SERPL-SCNC: 25 MMOL/L (ref 20–28)
CREAT SERPL-MCNC: 1.01 MG/DL (ref 0.6–1.1)
DIFFERENTIAL METHOD BLD: ABNORMAL
EOSINOPHIL # BLD: 0.2 K/UL (ref 0–0.8)
EOSINOPHIL NFR BLD: 7 % (ref 0.5–7.8)
ERYTHROCYTE [DISTWIDTH] IN BLOOD BY AUTOMATED COUNT: 12.2 % (ref 11.9–14.6)
GLOBULIN SER CALC-MCNC: 2.1 G/DL (ref 2.3–3.5)
GLUCOSE SERPL-MCNC: 106 MG/DL (ref 70–99)
HCT VFR BLD AUTO: 39 % (ref 35.8–46.3)
HDLC SERPL-MCNC: 71 MG/DL (ref 40–60)
HDLC SERPL: 2.8 (ref 0–5)
HGB BLD-MCNC: 13.2 G/DL (ref 11.7–15.4)
IMM GRANULOCYTES # BLD AUTO: 0 K/UL (ref 0–0.5)
IMM GRANULOCYTES NFR BLD AUTO: 0 % (ref 0–5)
LDLC SERPL CALC-MCNC: 105 MG/DL (ref 0–100)
LYMPHOCYTES # BLD: 1 K/UL (ref 0.5–4.6)
LYMPHOCYTES NFR BLD: 31 % (ref 13–44)
MCH RBC QN AUTO: 34.1 PG (ref 26.1–32.9)
MCHC RBC AUTO-ENTMCNC: 33.8 G/DL (ref 31.4–35)
MCV RBC AUTO: 100.8 FL (ref 82–102)
MONOCYTES # BLD: 0.3 K/UL (ref 0.1–1.3)
MONOCYTES NFR BLD: 10 % (ref 4–12)
NEUTS SEG # BLD: 1.7 K/UL (ref 1.7–8.2)
NEUTS SEG NFR BLD: 51 % (ref 43–78)
NRBC # BLD: 0 K/UL (ref 0–0.2)
PLATELET # BLD AUTO: 210 K/UL (ref 150–450)
PMV BLD AUTO: 10.4 FL (ref 9.4–12.3)
POTASSIUM SERPL-SCNC: 4.5 MMOL/L (ref 3.5–5.1)
PROT SERPL-MCNC: 6.1 G/DL (ref 6.3–8.2)
RBC # BLD AUTO: 3.87 M/UL (ref 4.05–5.2)
SODIUM SERPL-SCNC: 142 MMOL/L (ref 136–145)
TRIGL SERPL-MCNC: 95 MG/DL (ref 0–150)
TSH, 3RD GENERATION: 0.91 UIU/ML (ref 0.27–4.2)
VLDLC SERPL CALC-MCNC: 19 MG/DL (ref 6–23)
WBC # BLD AUTO: 3.3 K/UL (ref 4.3–11.1)

## 2024-06-01 DIAGNOSIS — F32.A DEPRESSION, UNSPECIFIED: ICD-10-CM

## 2024-06-04 ENCOUNTER — OFFICE VISIT (OUTPATIENT)
Dept: BEHAVIORAL/MENTAL HEALTH CLINIC | Age: 59
End: 2024-06-04
Payer: COMMERCIAL

## 2024-06-04 VITALS
WEIGHT: 164 LBS | DIASTOLIC BLOOD PRESSURE: 72 MMHG | SYSTOLIC BLOOD PRESSURE: 134 MMHG | TEMPERATURE: 98 F | HEIGHT: 68 IN | HEART RATE: 53 BPM | OXYGEN SATURATION: 98 % | BODY MASS INDEX: 24.86 KG/M2

## 2024-06-04 DIAGNOSIS — F41.0 PANIC DISORDER: ICD-10-CM

## 2024-06-04 DIAGNOSIS — F41.1 GENERALIZED ANXIETY DISORDER: Primary | ICD-10-CM

## 2024-06-04 DIAGNOSIS — F33.1 MODERATE EPISODE OF RECURRENT MAJOR DEPRESSIVE DISORDER (HCC): ICD-10-CM

## 2024-06-04 DIAGNOSIS — F43.10 POST TRAUMATIC STRESS DISORDER (PTSD): ICD-10-CM

## 2024-06-04 DIAGNOSIS — F51.04 PSYCHOPHYSIOLOGIC INSOMNIA: ICD-10-CM

## 2024-06-04 PROCEDURE — 99204 OFFICE O/P NEW MOD 45 MIN: CPT

## 2024-06-04 RX ORDER — BUPROPION HYDROCHLORIDE 150 MG/1
300 TABLET ORAL EVERY MORNING
Qty: 30 TABLET | Refills: 1 | Status: SHIPPED | OUTPATIENT
Start: 2024-06-04 | End: 2024-06-07

## 2024-06-04 RX ORDER — BUSPIRONE HYDROCHLORIDE 10 MG/1
10 TABLET ORAL 2 TIMES DAILY
Qty: 60 TABLET | Refills: 1 | Status: SHIPPED | OUTPATIENT
Start: 2024-06-04

## 2024-06-04 RX ORDER — BUPROPION HYDROCHLORIDE 300 MG/1
300 TABLET ORAL EVERY MORNING
Qty: 30 TABLET | Refills: 0 | Status: SHIPPED | OUTPATIENT
Start: 2024-06-04 | End: 2024-06-04 | Stop reason: SDUPTHER

## 2024-06-04 RX ORDER — FLUOXETINE HYDROCHLORIDE 20 MG/1
20 CAPSULE ORAL DAILY
Qty: 30 CAPSULE | Refills: 3 | Status: SHIPPED | OUTPATIENT
Start: 2024-06-04

## 2024-06-04 RX ORDER — TRAZODONE HYDROCHLORIDE 50 MG/1
TABLET ORAL
Qty: 60 TABLET | Refills: 1 | Status: SHIPPED | OUTPATIENT
Start: 2024-06-04

## 2024-06-04 RX ORDER — CITALOPRAM 20 MG/1
20 TABLET ORAL DAILY
Qty: 14 TABLET | Refills: 0 | Status: SHIPPED | OUTPATIENT
Start: 2024-06-04 | End: 2024-06-07

## 2024-06-04 ASSESSMENT — PATIENT HEALTH QUESTIONNAIRE - PHQ9
7. TROUBLE CONCENTRATING ON THINGS, SUCH AS READING THE NEWSPAPER OR WATCHING TELEVISION: SEVERAL DAYS
5. POOR APPETITE OR OVEREATING: NOT AT ALL
SUM OF ALL RESPONSES TO PHQ9 QUESTIONS 1 & 2: 3
2. FEELING DOWN, DEPRESSED OR HOPELESS: SEVERAL DAYS
2. FEELING DOWN, DEPRESSED OR HOPELESS: SEVERAL DAYS
9. THOUGHTS THAT YOU WOULD BE BETTER OFF DEAD, OR OF HURTING YOURSELF: SEVERAL DAYS
8. MOVING OR SPEAKING SO SLOWLY THAT OTHER PEOPLE COULD HAVE NOTICED. OR THE OPPOSITE - BEING SO FIDGETY OR RESTLESS THAT YOU HAVE BEEN MOVING AROUND A LOT MORE THAN USUAL: SEVERAL DAYS
SUM OF ALL RESPONSES TO PHQ QUESTIONS 1-9: 13
6. FEELING BAD ABOUT YOURSELF - OR THAT YOU ARE A FAILURE OR HAVE LET YOURSELF OR YOUR FAMILY DOWN: NOT AT ALL
4. FEELING TIRED OR HAVING LITTLE ENERGY: SEVERAL DAYS
7. TROUBLE CONCENTRATING ON THINGS, SUCH AS READING THE NEWSPAPER OR WATCHING TELEVISION: SEVERAL DAYS
4. FEELING TIRED OR HAVING LITTLE ENERGY: SEVERAL DAYS
9. THOUGHTS THAT YOU WOULD BE BETTER OFF DEAD, OR OF HURTING YOURSELF: NOT AT ALL
1. LITTLE INTEREST OR PLEASURE IN DOING THINGS: SEVERAL DAYS
SUM OF ALL RESPONSES TO PHQ QUESTIONS 1-9: 8
SUM OF ALL RESPONSES TO PHQ QUESTIONS 1-9: 8
1. LITTLE INTEREST OR PLEASURE IN DOING THINGS: SEVERAL DAYS
6. FEELING BAD ABOUT YOURSELF - OR THAT YOU ARE A FAILURE OR HAVE LET YOURSELF OR YOUR FAMILY DOWN: NOT AT ALL
8. MOVING OR SPEAKING SO SLOWLY THAT OTHER PEOPLE COULD HAVE NOTICED. OR THE OPPOSITE, BEING SO FIGETY OR RESTLESS THAT YOU HAVE BEEN MOVING AROUND A LOT MORE THAN USUAL: SEVERAL DAYS
5. POOR APPETITE OR OVEREATING: MORE THAN HALF THE DAYS
10. IF YOU CHECKED OFF ANY PROBLEMS, HOW DIFFICULT HAVE THESE PROBLEMS MADE IT FOR YOU TO DO YOUR WORK, TAKE CARE OF THINGS AT HOME, OR GET ALONG WITH OTHER PEOPLE: SOMEWHAT DIFFICULT
10. IF YOU CHECKED OFF ANY PROBLEMS, HOW DIFFICULT HAVE THESE PROBLEMS MADE IT FOR YOU TO DO YOUR WORK, TAKE CARE OF THINGS AT HOME, OR GET ALONG WITH OTHER PEOPLE: SOMEWHAT DIFFICULT
SUM OF ALL RESPONSES TO PHQ QUESTIONS 1-9: 13
3. TROUBLE FALLING OR STAYING ASLEEP: NEARLY EVERY DAY
2. FEELING DOWN, DEPRESSED OR HOPELESS: MORE THAN HALF THE DAYS
7. TROUBLE CONCENTRATING ON THINGS, SUCH AS READING THE NEWSPAPER OR WATCHING TELEVISION: SEVERAL DAYS
SUM OF ALL RESPONSES TO PHQ QUESTIONS 1-9: 13
SUM OF ALL RESPONSES TO PHQ9 QUESTIONS 1 & 2: 2
SUM OF ALL RESPONSES TO PHQ QUESTIONS 1-9: 8
1. LITTLE INTEREST OR PLEASURE IN DOING THINGS: SEVERAL DAYS
4. FEELING TIRED OR HAVING LITTLE ENERGY: SEVERAL DAYS
6. FEELING BAD ABOUT YOURSELF - OR THAT YOU ARE A FAILURE OR HAVE LET YOURSELF OR YOUR FAMILY DOWN: SEVERAL DAYS
9. THOUGHTS THAT YOU WOULD BE BETTER OFF DEAD, OR OF HURTING YOURSELF: NOT AT ALL
3. TROUBLE FALLING OR STAYING ASLEEP: NEARLY EVERY DAY
8. MOVING OR SPEAKING SO SLOWLY THAT OTHER PEOPLE COULD HAVE NOTICED. OR THE OPPOSITE, BEING SO FIGETY OR RESTLESS THAT YOU HAVE BEEN MOVING AROUND A LOT MORE THAN USUAL: SEVERAL DAYS
5. POOR APPETITE OR OVEREATING: NOT AT ALL
SUM OF ALL RESPONSES TO PHQ QUESTIONS 1-9: 8
SUM OF ALL RESPONSES TO PHQ QUESTIONS 1-9: 12
SUM OF ALL RESPONSES TO PHQ QUESTIONS 1-9: 8
10. IF YOU CHECKED OFF ANY PROBLEMS, HOW DIFFICULT HAVE THESE PROBLEMS MADE IT FOR YOU TO DO YOUR WORK, TAKE CARE OF THINGS AT HOME, OR GET ALONG WITH OTHER PEOPLE: SOMEWHAT DIFFICULT
3. TROUBLE FALLING OR STAYING ASLEEP: NEARLY EVERY DAY

## 2024-06-04 ASSESSMENT — ANXIETY QUESTIONNAIRES
3. WORRYING TOO MUCH ABOUT DIFFERENT THINGS: MORE THAN HALF THE DAYS
7. FEELING AFRAID AS IF SOMETHING AWFUL MIGHT HAPPEN: NOT AT ALL
5. BEING SO RESTLESS THAT IT IS HARD TO SIT STILL: MORE THAN HALF THE DAYS
GAD7 TOTAL SCORE: 13
IF YOU CHECKED OFF ANY PROBLEMS ON THIS QUESTIONNAIRE, HOW DIFFICULT HAVE THESE PROBLEMS MADE IT FOR YOU TO DO YOUR WORK, TAKE CARE OF THINGS AT HOME, OR GET ALONG WITH OTHER PEOPLE: SOMEWHAT DIFFICULT
4. TROUBLE RELAXING: MORE THAN HALF THE DAYS
1. FEELING NERVOUS, ANXIOUS, OR ON EDGE: MORE THAN HALF THE DAYS
2. NOT BEING ABLE TO STOP OR CONTROL WORRYING: NEARLY EVERY DAY
6. BECOMING EASILY ANNOYED OR IRRITABLE: MORE THAN HALF THE DAYS

## 2024-06-04 ASSESSMENT — COLUMBIA-SUICIDE SEVERITY RATING SCALE - C-SSRS
2. HAVE YOU ACTUALLY HAD ANY THOUGHTS OF KILLING YOURSELF?: NO
7. DID THIS OCCUR IN THE LAST THREE MONTHS: NO
1. WITHIN THE PAST MONTH, HAVE YOU WISHED YOU WERE DEAD OR WISHED YOU COULD GO TO SLEEP AND NOT WAKE UP?: NO
6. HAVE YOU EVER DONE ANYTHING, STARTED TO DO ANYTHING, OR PREPARED TO DO ANYTHING TO END YOUR LIFE?: YES

## 2024-06-04 NOTE — TELEPHONE ENCOUNTER
Please advise. Pt last seen on 4/1/24, follow-up scheduled for 6/7/24. Rx last wrote unknown, rx is pended.

## 2024-06-04 NOTE — PROGRESS NOTES
05/31/2024 07:40  98 - 107 mmol/L Final     CO2   Date/Time Value Ref Range Status   05/31/2024 07:40 AM 25 20 - 28 mmol/L Final     BUN   Date/Time Value Ref Range Status   05/31/2024 07:40 AM 15 6 - 23 MG/DL Final     ALT   Date/Time Value Ref Range Status   05/31/2024 07:40 AM 22 12 - 65 U/L Final     TSH   Date/Time Value Ref Range Status   04/11/2022 02:19 PM 0.731 0.450 - 4.500 uIU/mL Final       Lab Results   Component Value Date    TRIG 95 05/31/2024    HDL 71 (H) 05/31/2024     (H) 05/31/2024    CHOL 195 05/31/2024    GLUCOSE 106 (H) 05/31/2024       All pertinent/available labs reviewed.     Mental Status Examination    General/Appearance: Appears stated age and Well-kept    Behavior: no abnormalities noted    Eye Contact: good    Psychomotor: Within normal limits    Speech/Language: Within normal limits    Mood: euthymic    Affect: Appropriate    Thought process: goal directed    Thought content: No self-injurious thoughts, No suicidal ideation, No aggressive thoughts, and No homicidal ideation    Orientation: oriented to person, place, and time    Memory: Intact long-term and Intact short-term    Attention/Concentration: Sustained    Fund of knowledge: fair    Judgement: good    Insight: good     Questionnaire Findings:    PHQ:   PHQ-9 Total Score: 13 (6/4/2024  9:22 AM)  Thoughts that you would be better off dead, or of hurting yourself in some way: 1 (6/4/2024  9:22 AM)       GAD7:       6/4/2024     9:23 AM   JEAN CLAUDE-7 SCREENING   Feeling nervous, anxious, or on edge More than half the days   Not being able to stop or control worrying Nearly every day   Worrying too much about different things More than half the days   Trouble relaxing More than half the days   Being so restless that it is hard to sit still More than half the days   Becoming easily annoyed or irritable More than half the days   Feeling afraid as if something awful might happen Not at all   JEAN CLAUDE-7 Total Score 13   How

## 2024-06-04 NOTE — ASSESSMENT & PLAN NOTE
Cross taper celexa to prozac  Lower Wellbutrin ( could be making JEAN CLAUDE worse)  Continue Buspar

## 2024-06-07 ENCOUNTER — OFFICE VISIT (OUTPATIENT)
Dept: INTERNAL MEDICINE CLINIC | Facility: CLINIC | Age: 59
End: 2024-06-07
Payer: COMMERCIAL

## 2024-06-07 VITALS
SYSTOLIC BLOOD PRESSURE: 102 MMHG | WEIGHT: 164 LBS | BODY MASS INDEX: 24.86 KG/M2 | HEIGHT: 68 IN | DIASTOLIC BLOOD PRESSURE: 66 MMHG

## 2024-06-07 DIAGNOSIS — F41.9 ANXIETY: ICD-10-CM

## 2024-06-07 DIAGNOSIS — I25.83 CORONARY ARTERY DISEASE DUE TO LIPID RICH PLAQUE: ICD-10-CM

## 2024-06-07 DIAGNOSIS — R73.01 IMPAIRED FASTING BLOOD SUGAR: ICD-10-CM

## 2024-06-07 DIAGNOSIS — E78.2 MIXED HYPERLIPIDEMIA: ICD-10-CM

## 2024-06-07 DIAGNOSIS — C50.411 MALIGNANT NEOPLASM OF UPPER-OUTER QUADRANT OF RIGHT BREAST IN FEMALE, ESTROGEN RECEPTOR POSITIVE (HCC): ICD-10-CM

## 2024-06-07 DIAGNOSIS — Z00.00 ROUTINE GENERAL MEDICAL EXAMINATION AT HEALTH CARE FACILITY: Primary | ICD-10-CM

## 2024-06-07 DIAGNOSIS — Z17.0 MALIGNANT NEOPLASM OF UPPER-OUTER QUADRANT OF RIGHT BREAST IN FEMALE, ESTROGEN RECEPTOR POSITIVE (HCC): ICD-10-CM

## 2024-06-07 DIAGNOSIS — I25.10 CORONARY ARTERY DISEASE DUE TO LIPID RICH PLAQUE: ICD-10-CM

## 2024-06-07 DIAGNOSIS — L98.9 SKIN LESION OF BACK: ICD-10-CM

## 2024-06-07 LAB
EST. AVERAGE GLUCOSE BLD GHB EST-MCNC: 111 MG/DL
HBA1C MFR BLD: 5.5 % (ref 0–5.6)

## 2024-06-07 PROCEDURE — 99396 PREV VISIT EST AGE 40-64: CPT | Performed by: PHYSICIAN ASSISTANT

## 2024-06-07 RX ORDER — TIRZEPATIDE 2.5 MG/.5ML
1 INJECTION, SOLUTION SUBCUTANEOUS WEEKLY
Qty: 2 ML | Refills: 1 | Status: SHIPPED | OUTPATIENT
Start: 2024-06-07

## 2024-06-07 NOTE — PROGRESS NOTES
PROGRESS NOTE    Chief Complaint   Patient presents with    Annual Exam     CPE, review labs        HPI  HPI    Past Medical History, Past Surgical History, Family history, Social History, and Medications were all reviewed with the patient today and updated as necessary.       Current Outpatient Medications   Medication Sig Dispense Refill    Tirzepatide-Weight Management (ZEPBOUND) 2.5 MG/0.5ML SOAJ Inject 1 Dose into the skin once a week 2 mL 1    busPIRone (BUSPAR) 10 MG tablet Take 1 tablet by mouth 2 times daily (Patient taking differently: Take 1 tablet by mouth daily) 60 tablet 1    traZODone (DESYREL) 50 MG tablet Take 1-2 tab PO qHS 60 tablet 1    FLUoxetine (PROZAC) 20 MG capsule Take 1 capsule by mouth daily 30 capsule 3    atorvastatin (LIPITOR) 20 MG tablet Take 1 tablet by mouth daily 90 tablet 3    acyclovir (ZOVIRAX) 400 MG tablet Take 1 tablet by mouth 3 times daily as needed (prn) 90 tablet 0    anastrozole (ARIMIDEX) 1 MG tablet Take 1 tablet by mouth daily 30 tablet 0     No current facility-administered medications for this visit.     Allergies   Allergen Reactions    Cephalexin Rash    Droperidol Other (See Comments)     Seizure  Seizure      Penicillins Rash    Aripiprazole Other (See Comments)     Bad reaction     Patient Active Problem List   Diagnosis    Depression    Anxiety    Gastroesophageal reflux disease without esophagitis    Swelling    Malignant neoplasm of upper-outer quadrant of right breast in female, estrogen receptor positive (HCC)    Impacted cerumen of left ear    Arthralgia of left temporomandibular joint    Primary osteoarthritis of first carpometacarpal joint of right hand    Pure hypercholesterolemia    Psychophysiologic insomnia    Post traumatic stress disorder (PTSD)    Generalized anxiety disorder    Panic disorder     Past Medical History:   Diagnosis Date    Anxiety     Cancer (HCC) July 14    Depression     Osteoporosis     Panic attack 1998    Pneumonia     Sleep

## 2024-06-12 ENCOUNTER — TELEPHONE (OUTPATIENT)
Dept: BEHAVIORAL/MENTAL HEALTH CLINIC | Age: 59
End: 2024-06-12

## 2024-06-12 NOTE — TELEPHONE ENCOUNTER
----- Message from Bessy Fajardo sent at 6/12/2024  6:51 AM EDT -----  Regarding: Med for sleep  Contact: [phone]  Good Morning Cristine...  I'm having  trouble with the Triazadon.  It makes me too loopy and I don't like that feeling.  I'd rather take my Ambien.  Could you please recommend something else?  Thank you..  Bessy

## 2024-07-08 ENCOUNTER — OFFICE VISIT (OUTPATIENT)
Dept: BEHAVIORAL/MENTAL HEALTH CLINIC | Age: 59
End: 2024-07-08
Payer: COMMERCIAL

## 2024-07-08 VITALS
OXYGEN SATURATION: 99 % | WEIGHT: 166.4 LBS | HEART RATE: 45 BPM | BODY MASS INDEX: 25.22 KG/M2 | SYSTOLIC BLOOD PRESSURE: 126 MMHG | DIASTOLIC BLOOD PRESSURE: 80 MMHG | HEIGHT: 68 IN

## 2024-07-08 DIAGNOSIS — F43.10 POST TRAUMATIC STRESS DISORDER (PTSD): ICD-10-CM

## 2024-07-08 DIAGNOSIS — F41.0 PANIC DISORDER: Primary | ICD-10-CM

## 2024-07-08 DIAGNOSIS — F33.1 MODERATE EPISODE OF RECURRENT MAJOR DEPRESSIVE DISORDER (HCC): ICD-10-CM

## 2024-07-08 DIAGNOSIS — F51.04 PSYCHOPHYSIOLOGIC INSOMNIA: ICD-10-CM

## 2024-07-08 DIAGNOSIS — F41.1 GENERALIZED ANXIETY DISORDER: ICD-10-CM

## 2024-07-08 PROCEDURE — 99214 OFFICE O/P EST MOD 30 MIN: CPT

## 2024-07-08 RX ORDER — BUSPIRONE HYDROCHLORIDE 10 MG/1
10 TABLET ORAL 2 TIMES DAILY
Qty: 60 TABLET | Refills: 1 | Status: SHIPPED | OUTPATIENT
Start: 2024-07-08

## 2024-07-08 RX ORDER — NAPROXEN 500 MG/1
500 TABLET ORAL EVERY 12 HOURS
COMMUNITY
Start: 2024-07-07 | End: 2024-07-10

## 2024-07-08 RX ORDER — TRAZODONE HYDROCHLORIDE 50 MG/1
TABLET ORAL
Qty: 60 TABLET | Refills: 1 | Status: SHIPPED | OUTPATIENT
Start: 2024-07-08

## 2024-07-08 RX ORDER — COLCHICINE 0.6 MG/1
0.6 TABLET ORAL PRN
COMMUNITY
Start: 2024-07-07 | End: 2024-07-10

## 2024-07-08 ASSESSMENT — PATIENT HEALTH QUESTIONNAIRE - PHQ9
2. FEELING DOWN, DEPRESSED OR HOPELESS: NOT AT ALL
4. FEELING TIRED OR HAVING LITTLE ENERGY: NOT AT ALL
SUM OF ALL RESPONSES TO PHQ QUESTIONS 1-9: 2
3. TROUBLE FALLING OR STAYING ASLEEP: SEVERAL DAYS
SUM OF ALL RESPONSES TO PHQ QUESTIONS 1-9: 2
SUM OF ALL RESPONSES TO PHQ QUESTIONS 1-9: 2
8. MOVING OR SPEAKING SO SLOWLY THAT OTHER PEOPLE COULD HAVE NOTICED. OR THE OPPOSITE, BEING SO FIGETY OR RESTLESS THAT YOU HAVE BEEN MOVING AROUND A LOT MORE THAN USUAL: NOT AT ALL
5. POOR APPETITE OR OVEREATING: NOT AT ALL
SUM OF ALL RESPONSES TO PHQ9 QUESTIONS 1 & 2: 0
10. IF YOU CHECKED OFF ANY PROBLEMS, HOW DIFFICULT HAVE THESE PROBLEMS MADE IT FOR YOU TO DO YOUR WORK, TAKE CARE OF THINGS AT HOME, OR GET ALONG WITH OTHER PEOPLE: NOT DIFFICULT AT ALL
1. LITTLE INTEREST OR PLEASURE IN DOING THINGS: NOT AT ALL
SUM OF ALL RESPONSES TO PHQ QUESTIONS 1-9: 2
7. TROUBLE CONCENTRATING ON THINGS, SUCH AS READING THE NEWSPAPER OR WATCHING TELEVISION: NOT AT ALL
9. THOUGHTS THAT YOU WOULD BE BETTER OFF DEAD, OR OF HURTING YOURSELF: NOT AT ALL
6. FEELING BAD ABOUT YOURSELF - OR THAT YOU ARE A FAILURE OR HAVE LET YOURSELF OR YOUR FAMILY DOWN: SEVERAL DAYS

## 2024-07-08 ASSESSMENT — ANXIETY QUESTIONNAIRES
3. WORRYING TOO MUCH ABOUT DIFFERENT THINGS: NOT AT ALL
6. BECOMING EASILY ANNOYED OR IRRITABLE: SEVERAL DAYS
5. BEING SO RESTLESS THAT IT IS HARD TO SIT STILL: NOT AT ALL
7. FEELING AFRAID AS IF SOMETHING AWFUL MIGHT HAPPEN: NOT AT ALL
IF YOU CHECKED OFF ANY PROBLEMS ON THIS QUESTIONNAIRE, HOW DIFFICULT HAVE THESE PROBLEMS MADE IT FOR YOU TO DO YOUR WORK, TAKE CARE OF THINGS AT HOME, OR GET ALONG WITH OTHER PEOPLE: NOT DIFFICULT AT ALL
4. TROUBLE RELAXING: SEVERAL DAYS
2. NOT BEING ABLE TO STOP OR CONTROL WORRYING: NOT AT ALL

## 2024-07-08 NOTE — PROGRESS NOTES
controlled substances.     Disposition:  home    Follow Up:  Return in 2 months, or call in the interim for any side-effects, decompensation, questions, or problems between now and the next visit.     No follow-ups on file.     BRIANDA Mendoza [facility]

## 2024-07-09 ENCOUNTER — TELEMEDICINE (OUTPATIENT)
Dept: INTERNAL MEDICINE CLINIC | Facility: CLINIC | Age: 59
End: 2024-07-09
Payer: COMMERCIAL

## 2024-07-09 DIAGNOSIS — M79.674 PAIN OF TOE OF RIGHT FOOT: Primary | ICD-10-CM

## 2024-07-09 PROCEDURE — 99213 OFFICE O/P EST LOW 20 MIN: CPT | Performed by: PHYSICIAN ASSISTANT

## 2024-07-09 ASSESSMENT — ENCOUNTER SYMPTOMS
EYE PAIN: 0
VOICE CHANGE: 0
SHORTNESS OF BREATH: 0
NAUSEA: 0
CONSTIPATION: 0
WHEEZING: 0
SORE THROAT: 0
DIARRHEA: 0
VOMITING: 0
CHEST TIGHTNESS: 0
COUGH: 0
COLOR CHANGE: 0
ABDOMINAL PAIN: 0

## 2024-07-09 NOTE — PROGRESS NOTES
ROS  Review of Systems   Constitutional:  Negative for fatigue, fever and unexpected weight change.   HENT:  Negative for congestion, ear pain, hearing loss, sore throat, tinnitus and voice change.    Eyes:  Negative for pain and visual disturbance.   Respiratory:  Negative for cough, chest tightness, shortness of breath and wheezing.    Cardiovascular:  Negative for chest pain, palpitations and leg swelling.   Gastrointestinal:  Negative for abdominal pain, constipation, diarrhea, nausea and vomiting.   Genitourinary:  Negative for dysuria, frequency, hematuria and urgency.   Musculoskeletal:  Positive for arthralgias, gout and joint swelling. Negative for gait problem and neck pain.   Skin:  Negative for color change and rash.   Neurological:  Negative for dizziness, syncope, numbness and headaches.   Hematological:  Negative for adenopathy.   Psychiatric/Behavioral:  Negative for decreased concentration, hallucinations, sleep disturbance and suicidal ideas. The patient is not nervous/anxious.          OBJECTIVE:  There were no vitals taken for this visit.     PHYSICAL EXAM  Physical Exam  Constitutional:       Appearance: Normal appearance.   HENT:      Head: Normocephalic and atraumatic.      Nose: Nose normal.   Eyes:      Extraocular Movements: Extraocular movements intact.   Pulmonary:      Effort: Pulmonary effort is normal.   Musculoskeletal:         General: Normal range of motion.      Cervical back: Normal range of motion.   Skin:     Findings: No erythema or rash.   Neurological:      General: No focal deficit present.      Mental Status: She is alert and oriented to person, place, and time.   Psychiatric:         Mood and Affect: Mood normal.              Medical problems and test results were reviewed with the patient today.       ASSESSMENT and PLAN  Bessy was seen today for gout.    Diagnoses and all orders for this visit:    Pain of toe of right foot  Comments:  forth right toe pain, advised

## 2024-07-10 ENCOUNTER — OFFICE VISIT (OUTPATIENT)
Dept: INTERNAL MEDICINE CLINIC | Facility: CLINIC | Age: 59
End: 2024-07-10
Payer: COMMERCIAL

## 2024-07-10 VITALS
SYSTOLIC BLOOD PRESSURE: 130 MMHG | BODY MASS INDEX: 25.3 KG/M2 | OXYGEN SATURATION: 99 % | HEART RATE: 49 BPM | DIASTOLIC BLOOD PRESSURE: 80 MMHG | HEIGHT: 68 IN

## 2024-07-10 DIAGNOSIS — M79.672 LEFT FOOT PAIN: ICD-10-CM

## 2024-07-10 DIAGNOSIS — M79.674 PAIN OF TOE OF RIGHT FOOT: ICD-10-CM

## 2024-07-10 DIAGNOSIS — M79.672 LEFT FOOT PAIN: Primary | ICD-10-CM

## 2024-07-10 LAB
ALBUMIN SERPL-MCNC: 4.3 G/DL (ref 3.5–5)
ALBUMIN/GLOB SERPL: 2 (ref 1–1.9)
ALP SERPL-CCNC: 60 U/L (ref 35–104)
ALT SERPL-CCNC: 21 U/L (ref 12–65)
ANION GAP SERPL CALC-SCNC: 11 MMOL/L (ref 9–18)
AST SERPL-CCNC: 27 U/L (ref 15–37)
BASOPHILS # BLD: 0 K/UL (ref 0–0.2)
BASOPHILS NFR BLD: 1 % (ref 0–2)
BILIRUB SERPL-MCNC: 0.6 MG/DL (ref 0–1.2)
BUN SERPL-MCNC: 14 MG/DL (ref 6–23)
CALCIUM SERPL-MCNC: 9.6 MG/DL (ref 8.8–10.2)
CHLORIDE SERPL-SCNC: 103 MMOL/L (ref 98–107)
CO2 SERPL-SCNC: 25 MMOL/L (ref 20–28)
CREAT SERPL-MCNC: 0.98 MG/DL (ref 0.6–1.1)
DIFFERENTIAL METHOD BLD: ABNORMAL
EOSINOPHIL # BLD: 0.1 K/UL (ref 0–0.8)
EOSINOPHIL NFR BLD: 4 % (ref 0.5–7.8)
ERYTHROCYTE [DISTWIDTH] IN BLOOD BY AUTOMATED COUNT: 11.6 % (ref 11.9–14.6)
GLOBULIN SER CALC-MCNC: 2.1 G/DL (ref 2.3–3.5)
GLUCOSE SERPL-MCNC: 86 MG/DL (ref 70–99)
HCT VFR BLD AUTO: 38.5 % (ref 35.8–46.3)
HGB BLD-MCNC: 13.2 G/DL (ref 11.7–15.4)
IMM GRANULOCYTES # BLD AUTO: 0 K/UL (ref 0–0.5)
IMM GRANULOCYTES NFR BLD AUTO: 0 % (ref 0–5)
LYMPHOCYTES # BLD: 1.1 K/UL (ref 0.5–4.6)
LYMPHOCYTES NFR BLD: 28 % (ref 13–44)
MCH RBC QN AUTO: 34.6 PG (ref 26.1–32.9)
MCHC RBC AUTO-ENTMCNC: 34.3 G/DL (ref 31.4–35)
MCV RBC AUTO: 101 FL (ref 82–102)
MONOCYTES # BLD: 0.4 K/UL (ref 0.1–1.3)
MONOCYTES NFR BLD: 9 % (ref 4–12)
NEUTS SEG # BLD: 2.3 K/UL (ref 1.7–8.2)
NEUTS SEG NFR BLD: 58 % (ref 43–78)
NRBC # BLD: 0 K/UL (ref 0–0.2)
PLATELET # BLD AUTO: 247 K/UL (ref 150–450)
PMV BLD AUTO: 10.4 FL (ref 9.4–12.3)
POTASSIUM SERPL-SCNC: 4.2 MMOL/L (ref 3.5–5.1)
PROT SERPL-MCNC: 6.4 G/DL (ref 6.3–8.2)
RBC # BLD AUTO: 3.81 M/UL (ref 4.05–5.2)
SODIUM SERPL-SCNC: 139 MMOL/L (ref 136–145)
URATE SERPL-MCNC: 5.9 MG/DL (ref 2.5–7.1)
WBC # BLD AUTO: 3.9 K/UL (ref 4.3–11.1)

## 2024-07-10 PROCEDURE — 99213 OFFICE O/P EST LOW 20 MIN: CPT | Performed by: PHYSICIAN ASSISTANT

## 2024-07-10 ASSESSMENT — ENCOUNTER SYMPTOMS
DIARRHEA: 0
WHEEZING: 0
VOMITING: 0
COLOR CHANGE: 0
COUGH: 0
SHORTNESS OF BREATH: 0
NAUSEA: 0
ABDOMINAL PAIN: 0
SORE THROAT: 0
CONSTIPATION: 0
CHEST TIGHTNESS: 0
VOICE CHANGE: 0
EYE PAIN: 0

## 2024-07-10 NOTE — PROGRESS NOTES
PROGRESS NOTE    Chief Complaint   Patient presents with    Leg Problem     \"Knot\" on left lower leg that is sensitive to the touch        HPI  Leg Pain   The incident occurred 6 to 12 hours ago. The incident occurred at home. The injury mechanism is unknown. The pain is present in the left leg. Pertinent negatives include no numbness.       Past Medical History, Past Surgical History, Family history, Social History, and Medications were all reviewed with the patient today and updated as necessary.       Current Outpatient Medications   Medication Sig Dispense Refill    traZODone (DESYREL) 50 MG tablet Take 1-2 tab PO qHS 60 tablet 1    busPIRone (BUSPAR) 10 MG tablet Take 1 tablet by mouth 2 times daily 60 tablet 1    Semaglutide-Weight Management (WEGOVY) 0.25 MG/0.5ML SOAJ SC injection Inject 0.25 mg into the skin every 7 days 3 mL 0    FLUoxetine (PROZAC) 20 MG capsule Take 1 capsule by mouth daily 30 capsule 3    atorvastatin (LIPITOR) 20 MG tablet Take 1 tablet by mouth daily 90 tablet 3    acyclovir (ZOVIRAX) 400 MG tablet Take 1 tablet by mouth 3 times daily as needed (prn) 90 tablet 0    anastrozole (ARIMIDEX) 1 MG tablet Take 1 tablet by mouth daily 30 tablet 0     No current facility-administered medications for this visit.     Allergies   Allergen Reactions    Cephalexin Rash    Droperidol Other (See Comments)     Seizure      Penicillins Rash    Aripiprazole Other (See Comments)     Bad reaction     Patient Active Problem List   Diagnosis    Depression    Gastroesophageal reflux disease without esophagitis    Swelling    Malignant neoplasm of upper-outer quadrant of right breast in female, estrogen receptor positive (HCC)    Impacted cerumen of left ear    Arthralgia of left temporomandibular joint    Primary osteoarthritis of first carpometacarpal joint of right hand    Pure hypercholesterolemia    Psychophysiologic insomnia    Post traumatic stress disorder (PTSD)    Generalized anxiety disorder

## 2024-07-11 ENCOUNTER — TELEPHONE (OUTPATIENT)
Dept: INTERNAL MEDICINE CLINIC | Facility: CLINIC | Age: 59
End: 2024-07-11

## 2024-07-11 NOTE — TELEPHONE ENCOUNTER
Patient notified per Sital labs are ok, no signs of infection. Normal uric acid- no goat.  Patient verbalized understanding

## 2024-07-12 ENCOUNTER — HOSPITAL ENCOUNTER (OUTPATIENT)
Dept: ULTRASOUND IMAGING | Age: 59
Discharge: HOME | End: 2024-07-12
Payer: COMMERCIAL

## 2024-07-12 ENCOUNTER — TELEPHONE (OUTPATIENT)
Dept: INTERNAL MEDICINE CLINIC | Facility: CLINIC | Age: 59
End: 2024-07-12

## 2024-07-12 DIAGNOSIS — M79.662 PAIN AND SWELLING OF LEFT LOWER LEG: ICD-10-CM

## 2024-07-12 DIAGNOSIS — M79.89 PAIN AND SWELLING OF LEFT LOWER LEG: ICD-10-CM

## 2024-07-12 PROCEDURE — 93971 EXTREMITY STUDY: CPT

## 2024-10-28 ENCOUNTER — TELEMEDICINE (OUTPATIENT)
Dept: INTERNAL MEDICINE CLINIC | Facility: CLINIC | Age: 59
End: 2024-10-28
Payer: COMMERCIAL

## 2024-10-28 DIAGNOSIS — R05.9 COUGH, UNSPECIFIED TYPE: ICD-10-CM

## 2024-10-28 DIAGNOSIS — K59.00 CONSTIPATION, UNSPECIFIED CONSTIPATION TYPE: Primary | ICD-10-CM

## 2024-10-28 DIAGNOSIS — K21.9 GASTROESOPHAGEAL REFLUX DISEASE WITHOUT ESOPHAGITIS: ICD-10-CM

## 2024-10-28 PROCEDURE — 99213 OFFICE O/P EST LOW 20 MIN: CPT | Performed by: PHYSICIAN ASSISTANT

## 2024-10-28 RX ORDER — BENZONATATE 200 MG/1
200 CAPSULE ORAL 3 TIMES DAILY PRN
Qty: 30 CAPSULE | Refills: 0 | Status: SHIPPED | OUTPATIENT
Start: 2024-10-28 | End: 2024-11-04

## 2024-10-28 RX ORDER — AZITHROMYCIN 250 MG/1
250 TABLET, FILM COATED ORAL SEE ADMIN INSTRUCTIONS
Qty: 6 TABLET | Refills: 0 | Status: SHIPPED | OUTPATIENT
Start: 2024-10-28 | End: 2024-11-02

## 2024-10-28 SDOH — ECONOMIC STABILITY: FOOD INSECURITY: WITHIN THE PAST 12 MONTHS, YOU WORRIED THAT YOUR FOOD WOULD RUN OUT BEFORE YOU GOT MONEY TO BUY MORE.: NEVER TRUE

## 2024-10-28 SDOH — ECONOMIC STABILITY: FOOD INSECURITY: WITHIN THE PAST 12 MONTHS, THE FOOD YOU BOUGHT JUST DIDN'T LAST AND YOU DIDN'T HAVE MONEY TO GET MORE.: NEVER TRUE

## 2024-10-28 SDOH — ECONOMIC STABILITY: INCOME INSECURITY: HOW HARD IS IT FOR YOU TO PAY FOR THE VERY BASICS LIKE FOOD, HOUSING, MEDICAL CARE, AND HEATING?: NOT HARD AT ALL

## 2024-10-28 ASSESSMENT — ENCOUNTER SYMPTOMS
DIARRHEA: 0
ABDOMINAL PAIN: 0
COLOR CHANGE: 0
NAUSEA: 0
WHEEZING: 0
SORE THROAT: 0
CHEST TIGHTNESS: 0
CONSTIPATION: 0
VOICE CHANGE: 0
VOMITING: 0
EYE PAIN: 0
SHORTNESS OF BREATH: 0

## 2024-10-28 NOTE — PROGRESS NOTES
PROGRESS NOTE    Bessy Fajardo is a 59 y.o. female seen for a follow up visit regarding   Chief Complaint   Patient presents with    Irritable Bowel Syndrome     Concerned about some of her IBS issues. Doesn't need referral to GI (awaiting for appt)    Sore Throat     Sore throat and congestion. Fever of 100 yesterday. No covid test yet        HPI  HPI    Past Medical History, Past Surgical History, Family history, Social History, and Medications were all reviewed with the patient today and updated as necessary.       Current Outpatient Medications   Medication Sig Dispense Refill    azithromycin (ZITHROMAX) 250 MG tablet Take 1 tablet by mouth See Admin Instructions for 5 days 500mg on day 1 followed by 250mg on days 2 - 5 6 tablet 0    benzonatate (TESSALON) 200 MG capsule Take 1 capsule by mouth 3 times daily as needed for Cough 30 capsule 0    traZODone (DESYREL) 50 MG tablet Take 1-2 tab PO qHS 60 tablet 1    busPIRone (BUSPAR) 10 MG tablet Take 1 tablet by mouth 2 times daily 60 tablet 1    Semaglutide-Weight Management (WEGOVY) 0.25 MG/0.5ML SOAJ SC injection Inject 0.25 mg into the skin every 7 days 3 mL 0    FLUoxetine (PROZAC) 20 MG capsule Take 1 capsule by mouth daily 30 capsule 3    atorvastatin (LIPITOR) 20 MG tablet Take 1 tablet by mouth daily 90 tablet 3    acyclovir (ZOVIRAX) 400 MG tablet Take 1 tablet by mouth 3 times daily as needed (prn) 90 tablet 0    anastrozole (ARIMIDEX) 1 MG tablet Take 1 tablet by mouth daily 30 tablet 0     No current facility-administered medications for this visit.     Allergies   Allergen Reactions    Cephalexin Rash    Droperidol Other (See Comments)     Seizure      Penicillins Rash    Aripiprazole Other (See Comments)     Bad reaction         ROS  Review of Systems   Constitutional:  Negative for fatigue, fever and unexpected weight change.   HENT:  Positive for congestion. Negative for ear pain, hearing loss, sore throat, tinnitus and voice change.    Eyes:

## 2024-11-01 ENCOUNTER — OFFICE VISIT (OUTPATIENT)
Dept: BEHAVIORAL/MENTAL HEALTH CLINIC | Age: 59
End: 2024-11-01
Payer: COMMERCIAL

## 2024-11-01 VITALS
SYSTOLIC BLOOD PRESSURE: 130 MMHG | HEART RATE: 62 BPM | HEIGHT: 68 IN | OXYGEN SATURATION: 96 % | BODY MASS INDEX: 22.7 KG/M2 | WEIGHT: 149.8 LBS | TEMPERATURE: 98.1 F | DIASTOLIC BLOOD PRESSURE: 66 MMHG

## 2024-11-01 DIAGNOSIS — F41.1 GENERALIZED ANXIETY DISORDER: ICD-10-CM

## 2024-11-01 DIAGNOSIS — F41.0 PANIC DISORDER: Primary | ICD-10-CM

## 2024-11-01 DIAGNOSIS — F51.04 PSYCHOPHYSIOLOGIC INSOMNIA: ICD-10-CM

## 2024-11-01 DIAGNOSIS — F43.10 POST TRAUMATIC STRESS DISORDER (PTSD): ICD-10-CM

## 2024-11-01 DIAGNOSIS — F33.1 MODERATE EPISODE OF RECURRENT MAJOR DEPRESSIVE DISORDER (HCC): ICD-10-CM

## 2024-11-01 PROCEDURE — 99214 OFFICE O/P EST MOD 30 MIN: CPT

## 2024-11-01 RX ORDER — TRAZODONE HYDROCHLORIDE 50 MG/1
TABLET, FILM COATED ORAL
Qty: 60 TABLET | Refills: 1 | Status: CANCELLED | OUTPATIENT
Start: 2024-11-01

## 2024-11-01 RX ORDER — BUSPIRONE HYDROCHLORIDE 10 MG/1
10 TABLET ORAL 2 TIMES DAILY
Qty: 60 TABLET | Refills: 1 | Status: SHIPPED | OUTPATIENT
Start: 2024-11-01

## 2024-11-01 RX ORDER — MIRTAZAPINE 15 MG/1
15 TABLET, FILM COATED ORAL NIGHTLY
Qty: 30 TABLET | Refills: 1 | Status: SHIPPED | OUTPATIENT
Start: 2024-11-01

## 2024-11-01 RX ORDER — FLUOXETINE 40 MG/1
40 CAPSULE ORAL DAILY
Qty: 30 CAPSULE | Refills: 1 | Status: SHIPPED | OUTPATIENT
Start: 2024-11-01

## 2024-11-01 ASSESSMENT — PATIENT HEALTH QUESTIONNAIRE - PHQ9
SUM OF ALL RESPONSES TO PHQ QUESTIONS 1-9: 3
10. IF YOU CHECKED OFF ANY PROBLEMS, HOW DIFFICULT HAVE THESE PROBLEMS MADE IT FOR YOU TO DO YOUR WORK, TAKE CARE OF THINGS AT HOME, OR GET ALONG WITH OTHER PEOPLE: NOT DIFFICULT AT ALL
6. FEELING BAD ABOUT YOURSELF - OR THAT YOU ARE A FAILURE OR HAVE LET YOURSELF OR YOUR FAMILY DOWN: NOT AT ALL
9. THOUGHTS THAT YOU WOULD BE BETTER OFF DEAD, OR OF HURTING YOURSELF: NOT AT ALL
7. TROUBLE CONCENTRATING ON THINGS, SUCH AS READING THE NEWSPAPER OR WATCHING TELEVISION: NOT AT ALL
10. IF YOU CHECKED OFF ANY PROBLEMS, HOW DIFFICULT HAVE THESE PROBLEMS MADE IT FOR YOU TO DO YOUR WORK, TAKE CARE OF THINGS AT HOME, OR GET ALONG WITH OTHER PEOPLE: NOT DIFFICULT AT ALL
4. FEELING TIRED OR HAVING LITTLE ENERGY: SEVERAL DAYS
5. POOR APPETITE OR OVEREATING: NOT AT ALL
SUM OF ALL RESPONSES TO PHQ QUESTIONS 1-9: 3
SUM OF ALL RESPONSES TO PHQ9 QUESTIONS 1 & 2: 0
8. MOVING OR SPEAKING SO SLOWLY THAT OTHER PEOPLE COULD HAVE NOTICED. OR THE OPPOSITE - BEING SO FIDGETY OR RESTLESS THAT YOU HAVE BEEN MOVING AROUND A LOT MORE THAN USUAL: NOT AT ALL
5. POOR APPETITE OR OVEREATING: NOT AT ALL
SUM OF ALL RESPONSES TO PHQ QUESTIONS 1-9: 3
3. TROUBLE FALLING OR STAYING ASLEEP: MORE THAN HALF THE DAYS
1. LITTLE INTEREST OR PLEASURE IN DOING THINGS: NOT AT ALL
8. MOVING OR SPEAKING SO SLOWLY THAT OTHER PEOPLE COULD HAVE NOTICED. OR THE OPPOSITE, BEING SO FIGETY OR RESTLESS THAT YOU HAVE BEEN MOVING AROUND A LOT MORE THAN USUAL: NOT AT ALL
4. FEELING TIRED OR HAVING LITTLE ENERGY: SEVERAL DAYS
2. FEELING DOWN, DEPRESSED OR HOPELESS: NOT AT ALL
2. FEELING DOWN, DEPRESSED OR HOPELESS: NOT AT ALL
SUM OF ALL RESPONSES TO PHQ QUESTIONS 1-9: 3
SUM OF ALL RESPONSES TO PHQ QUESTIONS 1-9: 3
9. THOUGHTS THAT YOU WOULD BE BETTER OFF DEAD, OR OF HURTING YOURSELF: NOT AT ALL
1. LITTLE INTEREST OR PLEASURE IN DOING THINGS: NOT AT ALL
6. FEELING BAD ABOUT YOURSELF - OR THAT YOU ARE A FAILURE OR HAVE LET YOURSELF OR YOUR FAMILY DOWN: NOT AT ALL
3. TROUBLE FALLING OR STAYING ASLEEP: MORE THAN HALF THE DAYS
7. TROUBLE CONCENTRATING ON THINGS, SUCH AS READING THE NEWSPAPER OR WATCHING TELEVISION: NOT AT ALL

## 2024-11-01 ASSESSMENT — ANXIETY QUESTIONNAIRES
1. FEELING NERVOUS, ANXIOUS, OR ON EDGE: NOT AT ALL
2. NOT BEING ABLE TO STOP OR CONTROL WORRYING: SEVERAL DAYS
3. WORRYING TOO MUCH ABOUT DIFFERENT THINGS: SEVERAL DAYS
2. NOT BEING ABLE TO STOP OR CONTROL WORRYING: SEVERAL DAYS
3. WORRYING TOO MUCH ABOUT DIFFERENT THINGS: SEVERAL DAYS
IF YOU CHECKED OFF ANY PROBLEMS ON THIS QUESTIONNAIRE, HOW DIFFICULT HAVE THESE PROBLEMS MADE IT FOR YOU TO DO YOUR WORK, TAKE CARE OF THINGS AT HOME, OR GET ALONG WITH OTHER PEOPLE: NOT DIFFICULT AT ALL
4. TROUBLE RELAXING: NOT AT ALL
4. TROUBLE RELAXING: NOT AT ALL
7. FEELING AFRAID AS IF SOMETHING AWFUL MIGHT HAPPEN: NOT AT ALL
IF YOU CHECKED OFF ANY PROBLEMS ON THIS QUESTIONNAIRE, HOW DIFFICULT HAVE THESE PROBLEMS MADE IT FOR YOU TO DO YOUR WORK, TAKE CARE OF THINGS AT HOME, OR GET ALONG WITH OTHER PEOPLE: NOT DIFFICULT AT ALL
GAD7 TOTAL SCORE: 2
5. BEING SO RESTLESS THAT IT IS HARD TO SIT STILL: NOT AT ALL
6. BECOMING EASILY ANNOYED OR IRRITABLE: NOT AT ALL
7. FEELING AFRAID AS IF SOMETHING AWFUL MIGHT HAPPEN: NOT AT ALL
6. BECOMING EASILY ANNOYED OR IRRITABLE: NOT AT ALL
5. BEING SO RESTLESS THAT IT IS HARD TO SIT STILL: NOT AT ALL
1. FEELING NERVOUS, ANXIOUS, OR ON EDGE: NOT AT ALL

## 2024-11-01 NOTE — PROGRESS NOTES
difficult        Assessment:    1. Panic disorder  Assessment & Plan:  Increase Prozac 40mg and Buspar 10mg BID  2. Generalized anxiety disorder  The following orders have not been finalized:  -     FLUoxetine (PROZAC) 40 MG capsule  -     busPIRone (BUSPAR) 10 MG tablet  3. Post traumatic stress disorder (PTSD)  Assessment & Plan:   Asymptomatic, continue current medications- Prozac 40mg  Rec Psychotherapy   The following orders have not been finalized:  Orders:  -     FLUoxetine (PROZAC) 40 MG capsule  4. Moderate episode of recurrent major depressive disorder (HCC)  Assessment & Plan:  Increase prozac to 40mg QD  The following orders have not been finalized:  Orders:  -     FLUoxetine (PROZAC) 40 MG capsule  5. Psychophysiologic insomnia  Assessment & Plan:  Had reported Trazodone 75mg and melatonin working at last apt- now no benefit will switch to mirtazapine.         Patient Education and Counseling:  Supportive therapy provided for identified psychosocial stressors.  Medication education provided and decisions regarding medication regimen discussed with patient.     Plan:  -  medication changes made above  -  Crisis plan reviewed and patient verbally contracts for safety.  Go to ED with emergent symptoms or safety concerns.  -  Risks, benefits, side effects of medications, including any / all black box warnings, discussed with patient, who verbalizes their understanding.     - [place] web site checked for controlled substances.     Disposition:  home    Follow Up:  Return in 8 weeks, or call in the interim for any side-effects, decompensation, questions, or problems between now and the next visit.     No follow-ups on file.       Cristine Stephens, BRIANDA   [facility]al [facility]

## 2024-11-01 NOTE — ASSESSMENT & PLAN NOTE
Had reported Trazodone 75mg and melatonin working at last apt- now no benefit will switch to mirtazapine.

## 2024-11-25 RX ORDER — MIRTAZAPINE 15 MG/1
TABLET, FILM COATED ORAL
Qty: 60 TABLET | Refills: 1 | Status: SHIPPED | OUTPATIENT
Start: 2024-11-25

## 2024-11-25 NOTE — TELEPHONE ENCOUNTER
Hi...  Hope all is well with you.  I sent in a refill for my sleepy medicine.  I started taking 2 a night as you suggested and it's worked much better.  Thank you and Happy Thanksgiving

## 2024-12-14 DIAGNOSIS — F33.1 MODERATE EPISODE OF RECURRENT MAJOR DEPRESSIVE DISORDER (HCC): ICD-10-CM

## 2024-12-14 DIAGNOSIS — F41.1 GENERALIZED ANXIETY DISORDER: ICD-10-CM

## 2024-12-14 DIAGNOSIS — F43.10 POST TRAUMATIC STRESS DISORDER (PTSD): ICD-10-CM

## 2024-12-16 RX ORDER — FLUOXETINE 40 MG/1
40 CAPSULE ORAL DAILY
Qty: 30 CAPSULE | Refills: 1 | OUTPATIENT
Start: 2024-12-16

## 2024-12-23 ENCOUNTER — HOSPITAL ENCOUNTER (OUTPATIENT)
Dept: MAMMOGRAPHY | Age: 59
Discharge: HOME | End: 2024-12-26
Payer: COMMERCIAL

## 2024-12-23 DIAGNOSIS — Z12.31 SCREENING MAMMOGRAM FOR BREAST CANCER: ICD-10-CM

## 2024-12-23 PROCEDURE — 77063 BREAST TOMOSYNTHESIS BI: CPT

## 2024-12-30 ENCOUNTER — OFFICE VISIT (OUTPATIENT)
Dept: BEHAVIORAL/MENTAL HEALTH CLINIC | Age: 59
End: 2024-12-30
Payer: COMMERCIAL

## 2024-12-30 VITALS
HEIGHT: 68 IN | WEIGHT: 160 LBS | HEART RATE: 66 BPM | SYSTOLIC BLOOD PRESSURE: 126 MMHG | BODY MASS INDEX: 24.25 KG/M2 | TEMPERATURE: 98.1 F | DIASTOLIC BLOOD PRESSURE: 70 MMHG | OXYGEN SATURATION: 99 %

## 2024-12-30 DIAGNOSIS — F43.10 POST TRAUMATIC STRESS DISORDER (PTSD): ICD-10-CM

## 2024-12-30 DIAGNOSIS — F41.0 PANIC DISORDER: ICD-10-CM

## 2024-12-30 DIAGNOSIS — F41.1 GENERALIZED ANXIETY DISORDER: ICD-10-CM

## 2024-12-30 DIAGNOSIS — F51.04 PSYCHOPHYSIOLOGIC INSOMNIA: Primary | ICD-10-CM

## 2024-12-30 DIAGNOSIS — F33.1 MODERATE EPISODE OF RECURRENT MAJOR DEPRESSIVE DISORDER (HCC): ICD-10-CM

## 2024-12-30 PROCEDURE — 99214 OFFICE O/P EST MOD 30 MIN: CPT

## 2024-12-30 RX ORDER — MIRTAZAPINE 15 MG/1
TABLET, FILM COATED ORAL
Qty: 60 TABLET | Refills: 2 | Status: SHIPPED | OUTPATIENT
Start: 2025-01-23

## 2024-12-30 RX ORDER — RAMELTEON 8 MG/1
8 TABLET ORAL NIGHTLY
Qty: 30 TABLET | Refills: 2 | Status: SHIPPED | OUTPATIENT
Start: 2024-12-30 | End: 2025-03-30

## 2024-12-30 RX ORDER — BUSPIRONE HYDROCHLORIDE 10 MG/1
10 TABLET ORAL 2 TIMES DAILY
Qty: 60 TABLET | Refills: 2 | Status: SHIPPED | OUTPATIENT
Start: 2024-12-30

## 2024-12-30 RX ORDER — FLUOXETINE 40 MG/1
40 CAPSULE ORAL DAILY
Qty: 30 CAPSULE | Status: CANCELLED | OUTPATIENT
Start: 2024-12-30

## 2024-12-30 ASSESSMENT — PATIENT HEALTH QUESTIONNAIRE - PHQ9
6. FEELING BAD ABOUT YOURSELF - OR THAT YOU ARE A FAILURE OR HAVE LET YOURSELF OR YOUR FAMILY DOWN: NOT AT ALL
5. POOR APPETITE OR OVEREATING: SEVERAL DAYS
10. IF YOU CHECKED OFF ANY PROBLEMS, HOW DIFFICULT HAVE THESE PROBLEMS MADE IT FOR YOU TO DO YOUR WORK, TAKE CARE OF THINGS AT HOME, OR GET ALONG WITH OTHER PEOPLE: SOMEWHAT DIFFICULT
7. TROUBLE CONCENTRATING ON THINGS, SUCH AS READING THE NEWSPAPER OR WATCHING TELEVISION: NOT AT ALL
3. TROUBLE FALLING OR STAYING ASLEEP: SEVERAL DAYS
5. POOR APPETITE OR OVEREATING: SEVERAL DAYS
9. THOUGHTS THAT YOU WOULD BE BETTER OFF DEAD, OR OF HURTING YOURSELF: NOT AT ALL
SUM OF ALL RESPONSES TO PHQ QUESTIONS 1-9: 3
8. MOVING OR SPEAKING SO SLOWLY THAT OTHER PEOPLE COULD HAVE NOTICED. OR THE OPPOSITE - BEING SO FIDGETY OR RESTLESS THAT YOU HAVE BEEN MOVING AROUND A LOT MORE THAN USUAL: NOT AT ALL
4. FEELING TIRED OR HAVING LITTLE ENERGY: SEVERAL DAYS
SUM OF ALL RESPONSES TO PHQ9 QUESTIONS 1 & 2: 0
9. THOUGHTS THAT YOU WOULD BE BETTER OFF DEAD, OR OF HURTING YOURSELF: NOT AT ALL
SUM OF ALL RESPONSES TO PHQ QUESTIONS 1-9: 3
SUM OF ALL RESPONSES TO PHQ QUESTIONS 1-9: 3
2. FEELING DOWN, DEPRESSED OR HOPELESS: NOT AT ALL
SUM OF ALL RESPONSES TO PHQ QUESTIONS 1-9: 3
6. FEELING BAD ABOUT YOURSELF - OR THAT YOU ARE A FAILURE OR HAVE LET YOURSELF OR YOUR FAMILY DOWN: NOT AT ALL
7. TROUBLE CONCENTRATING ON THINGS, SUCH AS READING THE NEWSPAPER OR WATCHING TELEVISION: NOT AT ALL
2. FEELING DOWN, DEPRESSED OR HOPELESS: NOT AT ALL
SUM OF ALL RESPONSES TO PHQ QUESTIONS 1-9: 3
3. TROUBLE FALLING OR STAYING ASLEEP: SEVERAL DAYS
1. LITTLE INTEREST OR PLEASURE IN DOING THINGS: NOT AT ALL
10. IF YOU CHECKED OFF ANY PROBLEMS, HOW DIFFICULT HAVE THESE PROBLEMS MADE IT FOR YOU TO DO YOUR WORK, TAKE CARE OF THINGS AT HOME, OR GET ALONG WITH OTHER PEOPLE: SOMEWHAT DIFFICULT
4. FEELING TIRED OR HAVING LITTLE ENERGY: SEVERAL DAYS
8. MOVING OR SPEAKING SO SLOWLY THAT OTHER PEOPLE COULD HAVE NOTICED. OR THE OPPOSITE, BEING SO FIGETY OR RESTLESS THAT YOU HAVE BEEN MOVING AROUND A LOT MORE THAN USUAL: NOT AT ALL
1. LITTLE INTEREST OR PLEASURE IN DOING THINGS: NOT AT ALL

## 2024-12-30 ASSESSMENT — ANXIETY QUESTIONNAIRES
6. BECOMING EASILY ANNOYED OR IRRITABLE: NOT AT ALL
4. TROUBLE RELAXING: NOT AT ALL
IF YOU CHECKED OFF ANY PROBLEMS ON THIS QUESTIONNAIRE, HOW DIFFICULT HAVE THESE PROBLEMS MADE IT FOR YOU TO DO YOUR WORK, TAKE CARE OF THINGS AT HOME, OR GET ALONG WITH OTHER PEOPLE: NOT DIFFICULT AT ALL
3. WORRYING TOO MUCH ABOUT DIFFERENT THINGS: NOT AT ALL
5. BEING SO RESTLESS THAT IT IS HARD TO SIT STILL: NOT AT ALL
7. FEELING AFRAID AS IF SOMETHING AWFUL MIGHT HAPPEN: NOT AT ALL
IF YOU CHECKED OFF ANY PROBLEMS ON THIS QUESTIONNAIRE, HOW DIFFICULT HAVE THESE PROBLEMS MADE IT FOR YOU TO DO YOUR WORK, TAKE CARE OF THINGS AT HOME, OR GET ALONG WITH OTHER PEOPLE: NOT DIFFICULT AT ALL
GAD7 TOTAL SCORE: 0
3. WORRYING TOO MUCH ABOUT DIFFERENT THINGS: NOT AT ALL
2. NOT BEING ABLE TO STOP OR CONTROL WORRYING: NOT AT ALL
4. TROUBLE RELAXING: NOT AT ALL
6. BECOMING EASILY ANNOYED OR IRRITABLE: NOT AT ALL
2. NOT BEING ABLE TO STOP OR CONTROL WORRYING: NOT AT ALL
7. FEELING AFRAID AS IF SOMETHING AWFUL MIGHT HAPPEN: NOT AT ALL
5. BEING SO RESTLESS THAT IT IS HARD TO SIT STILL: NOT AT ALL
1. FEELING NERVOUS, ANXIOUS, OR ON EDGE: NOT AT ALL
1. FEELING NERVOUS, ANXIOUS, OR ON EDGE: NOT AT ALL

## 2024-12-30 NOTE — PROGRESS NOTES
restless that you have been moving around a lot more than usual 0 0 0   Thoughts that you would be better off dead, or of hurting yourself in some way 0 0 0   PHQ-2 Score 0 0 0   PHQ-9 Total Score 3 3 2   If you checked off any problems, how difficult have these problems made it for you to do your work, take care of things at home, or get along with other people? 1 0 0      GAD7:      12/30/2024     7:49 AM 11/1/2024     7:38 AM 7/8/2024     9:18 AM   JEAN CLAUDE-7 SCREENING   Feeling nervous, anxious, or on edge Not at all Not at all    Not being able to stop or control worrying Not at all Several days Not at all   Worrying too much about different things Not at all Several days Not at all   Trouble relaxing Not at all Not at all Several days   Being so restless that it is hard to sit still Not at all Not at all Not at all   Becoming easily annoyed or irritable Not at all Not at all Several days   Feeling afraid as if something awful might happen Not at all Not at all Not at all   JEAN CLAUDE-7 Total Score 0 2    How difficult have these problems made it for you to do your work, take care of things at home, or get along with other people? Not difficult at all Not difficult at all Not difficult at all     Return to Clinic: 8 weeks OR sooner if needed  -I have reviewed the [employer] database report for this patient as required for prescription of controlled substances and did not note any discrepancies.  -Medication side effects were discussed and benefits v. risks were presented. Treatment plan was discussed and agreed to by patient.  -Diagnoses considered in this plan are \"working diagnoses\" and subject to change with additional information obtained over the course of additional time spent with patient and/or additional collateral information.  Crisis Plan:  Patient was encouraged to call 911 and /or go to the closest Emergency Department in case of agitation, severe anxiety, psychosis, rachel, suicidal or homicidal urges, worrisome

## 2025-01-20 ENCOUNTER — OFFICE VISIT (OUTPATIENT)
Dept: BEHAVIORAL/MENTAL HEALTH CLINIC | Age: 60
End: 2025-01-20
Payer: COMMERCIAL

## 2025-01-20 VITALS
TEMPERATURE: 97.3 F | OXYGEN SATURATION: 98 % | SYSTOLIC BLOOD PRESSURE: 127 MMHG | HEIGHT: 68 IN | WEIGHT: 164.4 LBS | BODY MASS INDEX: 24.92 KG/M2 | DIASTOLIC BLOOD PRESSURE: 70 MMHG | HEART RATE: 63 BPM

## 2025-01-20 DIAGNOSIS — F33.0 MILD EPISODE OF RECURRENT MAJOR DEPRESSIVE DISORDER (HCC): ICD-10-CM

## 2025-01-20 DIAGNOSIS — F51.04 PSYCHOPHYSIOLOGIC INSOMNIA: Primary | ICD-10-CM

## 2025-01-20 PROCEDURE — 99214 OFFICE O/P EST MOD 30 MIN: CPT

## 2025-01-20 RX ORDER — ESZOPICLONE 2 MG/1
2 TABLET, FILM COATED ORAL NIGHTLY
Qty: 30 TABLET | Refills: 1 | Status: SHIPPED | OUTPATIENT
Start: 2025-01-20 | End: 2025-03-21

## 2025-01-20 ASSESSMENT — PATIENT HEALTH QUESTIONNAIRE - PHQ9
6. FEELING BAD ABOUT YOURSELF - OR THAT YOU ARE A FAILURE OR HAVE LET YOURSELF OR YOUR FAMILY DOWN: NOT AT ALL
SUM OF ALL RESPONSES TO PHQ9 QUESTIONS 1 & 2: 1
1. LITTLE INTEREST OR PLEASURE IN DOING THINGS: SEVERAL DAYS
7. TROUBLE CONCENTRATING ON THINGS, SUCH AS READING THE NEWSPAPER OR WATCHING TELEVISION: NOT AT ALL
2. FEELING DOWN, DEPRESSED OR HOPELESS: NOT AT ALL
10. IF YOU CHECKED OFF ANY PROBLEMS, HOW DIFFICULT HAVE THESE PROBLEMS MADE IT FOR YOU TO DO YOUR WORK, TAKE CARE OF THINGS AT HOME, OR GET ALONG WITH OTHER PEOPLE: NOT DIFFICULT AT ALL
3. TROUBLE FALLING OR STAYING ASLEEP: NEARLY EVERY DAY
4. FEELING TIRED OR HAVING LITTLE ENERGY: MORE THAN HALF THE DAYS
SUM OF ALL RESPONSES TO PHQ QUESTIONS 1-9: 7
5. POOR APPETITE OR OVEREATING: SEVERAL DAYS
9. THOUGHTS THAT YOU WOULD BE BETTER OFF DEAD, OR OF HURTING YOURSELF: NOT AT ALL
8. MOVING OR SPEAKING SO SLOWLY THAT OTHER PEOPLE COULD HAVE NOTICED. OR THE OPPOSITE, BEING SO FIGETY OR RESTLESS THAT YOU HAVE BEEN MOVING AROUND A LOT MORE THAN USUAL: NOT AT ALL
SUM OF ALL RESPONSES TO PHQ QUESTIONS 1-9: 7

## 2025-01-20 ASSESSMENT — ANXIETY QUESTIONNAIRES
5. BEING SO RESTLESS THAT IT IS HARD TO SIT STILL: NOT AT ALL
3. WORRYING TOO MUCH ABOUT DIFFERENT THINGS: NOT AT ALL
2. NOT BEING ABLE TO STOP OR CONTROL WORRYING: NOT AT ALL
GAD7 TOTAL SCORE: 0
6. BECOMING EASILY ANNOYED OR IRRITABLE: NOT AT ALL
1. FEELING NERVOUS, ANXIOUS, OR ON EDGE: NOT AT ALL
IF YOU CHECKED OFF ANY PROBLEMS ON THIS QUESTIONNAIRE, HOW DIFFICULT HAVE THESE PROBLEMS MADE IT FOR YOU TO DO YOUR WORK, TAKE CARE OF THINGS AT HOME, OR GET ALONG WITH OTHER PEOPLE: NOT DIFFICULT AT ALL
7. FEELING AFRAID AS IF SOMETHING AWFUL MIGHT HAPPEN: NOT AT ALL
4. TROUBLE RELAXING: NOT AT ALL

## 2025-01-20 NOTE — PROGRESS NOTES
easily annoyed or irritable Not at all Several days Not at all   Feeling afraid as if something awful might happen Not at all Several days Not at all   JEAN CLAUDE-7 Total Score 0 7 0   How difficult have these problems made it for you to do your work, take care of things at home, or get along with other people? Not difficult at all Not difficult at all Not difficult at all     Return to Clinic: 4 weeks OR sooner if needed  -I have reviewed the [employer]CRIPTS database report for this patient as required for prescription of controlled substances and did not note any discrepancies.  -Medication side effects were discussed and benefits v. risks were presented. Treatment plan was discussed and agreed to by patient.  -Diagnoses considered in this plan are \"working diagnoses\" and subject to change with additional information obtained over the course of additional time spent with patient and/or additional collateral information.  Crisis Plan:  Patient was encouraged to call 911 and /or go to the closest Emergency Department in case of agitation, severe anxiety, psychosis, rachel, suicidal or homicidal urges, worrisome side effects to medications or other emergencies. Patient verbalized understanding of this plan and agreed to   BRIANDA Mendoza  [facility] Psychiatry & Behavioral Health

## 2025-02-11 RX ORDER — MIRTAZAPINE 15 MG/1
TABLET, FILM COATED ORAL
Qty: 60 TABLET | Refills: 1 | OUTPATIENT
Start: 2025-02-11

## 2025-02-18 DIAGNOSIS — F41.0 PANIC DISORDER: ICD-10-CM

## 2025-02-18 DIAGNOSIS — F41.1 GENERALIZED ANXIETY DISORDER: ICD-10-CM

## 2025-02-18 DIAGNOSIS — F33.1 MODERATE EPISODE OF RECURRENT MAJOR DEPRESSIVE DISORDER (HCC): ICD-10-CM

## 2025-02-18 DIAGNOSIS — F43.10 POST TRAUMATIC STRESS DISORDER (PTSD): ICD-10-CM

## 2025-02-18 RX ORDER — BUSPIRONE HYDROCHLORIDE 10 MG/1
10 TABLET ORAL 2 TIMES DAILY
Qty: 60 TABLET | Refills: 2 | OUTPATIENT
Start: 2025-02-18

## 2025-03-18 ASSESSMENT — PATIENT HEALTH QUESTIONNAIRE - PHQ9
4. FEELING TIRED OR HAVING LITTLE ENERGY: SEVERAL DAYS
8. MOVING OR SPEAKING SO SLOWLY THAT OTHER PEOPLE COULD HAVE NOTICED. OR THE OPPOSITE, BEING SO FIGETY OR RESTLESS THAT YOU HAVE BEEN MOVING AROUND A LOT MORE THAN USUAL: NOT AT ALL
SUM OF ALL RESPONSES TO PHQ QUESTIONS 1-9: 2
9. THOUGHTS THAT YOU WOULD BE BETTER OFF DEAD, OR OF HURTING YOURSELF: NOT AT ALL
10. IF YOU CHECKED OFF ANY PROBLEMS, HOW DIFFICULT HAVE THESE PROBLEMS MADE IT FOR YOU TO DO YOUR WORK, TAKE CARE OF THINGS AT HOME, OR GET ALONG WITH OTHER PEOPLE: NOT DIFFICULT AT ALL
7. TROUBLE CONCENTRATING ON THINGS, SUCH AS READING THE NEWSPAPER OR WATCHING TELEVISION: NOT AT ALL
5. POOR APPETITE OR OVEREATING: NOT AT ALL
3. TROUBLE FALLING OR STAYING ASLEEP: NOT AT ALL
7. TROUBLE CONCENTRATING ON THINGS, SUCH AS READING THE NEWSPAPER OR WATCHING TELEVISION: NOT AT ALL
1. LITTLE INTEREST OR PLEASURE IN DOING THINGS: NOT AT ALL
5. POOR APPETITE OR OVEREATING: NOT AT ALL
3. TROUBLE FALLING OR STAYING ASLEEP: NOT AT ALL
8. MOVING OR SPEAKING SO SLOWLY THAT OTHER PEOPLE COULD HAVE NOTICED. OR THE OPPOSITE - BEING SO FIDGETY OR RESTLESS THAT YOU HAVE BEEN MOVING AROUND A LOT MORE THAN USUAL: NOT AT ALL
SUM OF ALL RESPONSES TO PHQ QUESTIONS 1-9: 2
6. FEELING BAD ABOUT YOURSELF - OR THAT YOU ARE A FAILURE OR HAVE LET YOURSELF OR YOUR FAMILY DOWN: NOT AT ALL
4. FEELING TIRED OR HAVING LITTLE ENERGY: SEVERAL DAYS
10. IF YOU CHECKED OFF ANY PROBLEMS, HOW DIFFICULT HAVE THESE PROBLEMS MADE IT FOR YOU TO DO YOUR WORK, TAKE CARE OF THINGS AT HOME, OR GET ALONG WITH OTHER PEOPLE: NOT DIFFICULT AT ALL
SUM OF ALL RESPONSES TO PHQ QUESTIONS 1-9: 2
9. THOUGHTS THAT YOU WOULD BE BETTER OFF DEAD, OR OF HURTING YOURSELF: NOT AT ALL
1. LITTLE INTEREST OR PLEASURE IN DOING THINGS: NOT AT ALL
2. FEELING DOWN, DEPRESSED OR HOPELESS: SEVERAL DAYS
2. FEELING DOWN, DEPRESSED OR HOPELESS: SEVERAL DAYS
SUM OF ALL RESPONSES TO PHQ QUESTIONS 1-9: 2
6. FEELING BAD ABOUT YOURSELF - OR THAT YOU ARE A FAILURE OR HAVE LET YOURSELF OR YOUR FAMILY DOWN: NOT AT ALL
SUM OF ALL RESPONSES TO PHQ QUESTIONS 1-9: 2

## 2025-03-18 ASSESSMENT — ANXIETY QUESTIONNAIRES
2. NOT BEING ABLE TO STOP OR CONTROL WORRYING: SEVERAL DAYS
4. TROUBLE RELAXING: SEVERAL DAYS
3. WORRYING TOO MUCH ABOUT DIFFERENT THINGS: SEVERAL DAYS
5. BEING SO RESTLESS THAT IT IS HARD TO SIT STILL: SEVERAL DAYS
6. BECOMING EASILY ANNOYED OR IRRITABLE: SEVERAL DAYS
1. FEELING NERVOUS, ANXIOUS, OR ON EDGE: SEVERAL DAYS
3. WORRYING TOO MUCH ABOUT DIFFERENT THINGS: SEVERAL DAYS
IF YOU CHECKED OFF ANY PROBLEMS ON THIS QUESTIONNAIRE, HOW DIFFICULT HAVE THESE PROBLEMS MADE IT FOR YOU TO DO YOUR WORK, TAKE CARE OF THINGS AT HOME, OR GET ALONG WITH OTHER PEOPLE: SOMEWHAT DIFFICULT
4. TROUBLE RELAXING: SEVERAL DAYS
2. NOT BEING ABLE TO STOP OR CONTROL WORRYING: SEVERAL DAYS
GAD7 TOTAL SCORE: 6
6. BECOMING EASILY ANNOYED OR IRRITABLE: SEVERAL DAYS
7. FEELING AFRAID AS IF SOMETHING AWFUL MIGHT HAPPEN: NOT AT ALL
IF YOU CHECKED OFF ANY PROBLEMS ON THIS QUESTIONNAIRE, HOW DIFFICULT HAVE THESE PROBLEMS MADE IT FOR YOU TO DO YOUR WORK, TAKE CARE OF THINGS AT HOME, OR GET ALONG WITH OTHER PEOPLE: SOMEWHAT DIFFICULT
7. FEELING AFRAID AS IF SOMETHING AWFUL MIGHT HAPPEN: NOT AT ALL
5. BEING SO RESTLESS THAT IT IS HARD TO SIT STILL: SEVERAL DAYS
1. FEELING NERVOUS, ANXIOUS, OR ON EDGE: SEVERAL DAYS

## 2025-03-19 ENCOUNTER — OFFICE VISIT (OUTPATIENT)
Dept: BEHAVIORAL/MENTAL HEALTH CLINIC | Age: 60
End: 2025-03-19
Payer: COMMERCIAL

## 2025-03-19 VITALS
DIASTOLIC BLOOD PRESSURE: 60 MMHG | TEMPERATURE: 97.9 F | OXYGEN SATURATION: 96 % | WEIGHT: 161.8 LBS | HEIGHT: 68 IN | SYSTOLIC BLOOD PRESSURE: 128 MMHG | HEART RATE: 62 BPM | BODY MASS INDEX: 24.52 KG/M2

## 2025-03-19 DIAGNOSIS — F41.1 GENERALIZED ANXIETY DISORDER: ICD-10-CM

## 2025-03-19 DIAGNOSIS — F33.1 MODERATE EPISODE OF RECURRENT MAJOR DEPRESSIVE DISORDER (HCC): ICD-10-CM

## 2025-03-19 DIAGNOSIS — F41.0 PANIC DISORDER: ICD-10-CM

## 2025-03-19 DIAGNOSIS — F51.04 PSYCHOPHYSIOLOGIC INSOMNIA: ICD-10-CM

## 2025-03-19 DIAGNOSIS — F43.10 POST TRAUMATIC STRESS DISORDER (PTSD): ICD-10-CM

## 2025-03-19 PROCEDURE — 99214 OFFICE O/P EST MOD 30 MIN: CPT

## 2025-03-19 RX ORDER — MIRTAZAPINE 15 MG/1
0.5 TABLET, FILM COATED ORAL NIGHTLY
COMMUNITY
End: 2025-03-19 | Stop reason: SDUPTHER

## 2025-03-19 RX ORDER — MIRTAZAPINE 15 MG/1
7.5 TABLET, FILM COATED ORAL NIGHTLY
Qty: 30 TABLET | Refills: 5 | Status: SHIPPED | OUTPATIENT
Start: 2025-03-19

## 2025-03-19 RX ORDER — BUSPIRONE HYDROCHLORIDE 15 MG/1
15 TABLET ORAL 2 TIMES DAILY
Qty: 60 TABLET | Refills: 5 | Status: SHIPPED | OUTPATIENT
Start: 2025-03-30 | End: 2025-09-26

## 2025-03-19 RX ORDER — ESZOPICLONE 2 MG/1
2 TABLET, FILM COATED ORAL NIGHTLY
Qty: 30 TABLET | Refills: 5 | Status: SHIPPED | OUTPATIENT
Start: 2025-03-19 | End: 2025-09-15

## 2025-03-19 NOTE — PROGRESS NOTES
considered in this plan are \"working diagnoses\" and subject to change with additional information obtained over the course of additional time spent with patient and/or additional collateral information.  Crisis Plan:  Patient was encouraged to call 911 and /or go to the closest Emergency Department in case of agitation, severe anxiety, psychosis, rachel, suicidal or homicidal urges, worrisome side effects to medications or other emergencies. Patient verbalized understanding of this plan and agreed to   BRIANDA Mendoza  [facility] Psychiat[facility] & Behavioral Health

## 2025-03-25 RX ORDER — MIRTAZAPINE 15 MG/1
15 TABLET, FILM COATED ORAL NIGHTLY
Qty: 30 TABLET | Refills: 5 | OUTPATIENT
Start: 2025-03-25

## 2025-04-07 RX ORDER — MIRTAZAPINE 15 MG/1
7.5 TABLET, FILM COATED ORAL NIGHTLY
Qty: 30 TABLET | Refills: 5 | OUTPATIENT
Start: 2025-04-07

## 2025-04-16 DIAGNOSIS — F51.04 PSYCHOPHYSIOLOGIC INSOMNIA: ICD-10-CM

## 2025-04-17 RX ORDER — ESZOPICLONE 2 MG/1
TABLET, FILM COATED ORAL
Qty: 30 TABLET | OUTPATIENT
Start: 2025-04-17

## 2025-06-20 DIAGNOSIS — F43.10 POST TRAUMATIC STRESS DISORDER (PTSD): ICD-10-CM

## 2025-06-20 DIAGNOSIS — F33.1 MODERATE EPISODE OF RECURRENT MAJOR DEPRESSIVE DISORDER (HCC): ICD-10-CM

## 2025-06-20 DIAGNOSIS — F41.1 GENERALIZED ANXIETY DISORDER: ICD-10-CM

## 2025-06-27 ENCOUNTER — TELEPHONE (OUTPATIENT)
Dept: BEHAVIORAL/MENTAL HEALTH CLINIC | Age: 60
End: 2025-06-27

## 2025-06-27 NOTE — TELEPHONE ENCOUNTER
The pt called stating she needs a refill on her Prozac.  She would like for this to be sent to [facility] in [place].    Pts Ph# [phone]    I reviewed the pts chart.  Last appt: 3/19/25  Next appt: 8/5/25  Rx Last Sent: 3/30/25 for 3 po QD #90 x1; should have run out around 5/29/25  I will forward to the Covering Provider to see if they will send this in.

## 2025-06-30 ENCOUNTER — OFFICE VISIT (OUTPATIENT)
Dept: INTERNAL MEDICINE CLINIC | Facility: CLINIC | Age: 60
End: 2025-06-30
Payer: COMMERCIAL

## 2025-06-30 VITALS
HEART RATE: 88 BPM | DIASTOLIC BLOOD PRESSURE: 86 MMHG | SYSTOLIC BLOOD PRESSURE: 128 MMHG | OXYGEN SATURATION: 98 % | BODY MASS INDEX: 24.86 KG/M2 | HEIGHT: 68 IN | WEIGHT: 164 LBS

## 2025-06-30 DIAGNOSIS — M79.89 PAIN AND SWELLING OF LEFT LOWER LEG: Primary | ICD-10-CM

## 2025-06-30 DIAGNOSIS — F43.10 POST TRAUMATIC STRESS DISORDER (PTSD): ICD-10-CM

## 2025-06-30 DIAGNOSIS — F41.1 GENERALIZED ANXIETY DISORDER: ICD-10-CM

## 2025-06-30 DIAGNOSIS — B00.9 HERPES SIMPLEX: ICD-10-CM

## 2025-06-30 DIAGNOSIS — Z17.0 MALIGNANT NEOPLASM OF UPPER-OUTER QUADRANT OF RIGHT BREAST IN FEMALE, ESTROGEN RECEPTOR POSITIVE (HCC): ICD-10-CM

## 2025-06-30 DIAGNOSIS — F33.1 MODERATE EPISODE OF RECURRENT MAJOR DEPRESSIVE DISORDER (HCC): ICD-10-CM

## 2025-06-30 DIAGNOSIS — C50.411 MALIGNANT NEOPLASM OF UPPER-OUTER QUADRANT OF RIGHT BREAST IN FEMALE, ESTROGEN RECEPTOR POSITIVE (HCC): ICD-10-CM

## 2025-06-30 DIAGNOSIS — I87.2 VENOUS INSUFFICIENCY OF BOTH LOWER EXTREMITIES: ICD-10-CM

## 2025-06-30 DIAGNOSIS — F41.9 ANXIETY: ICD-10-CM

## 2025-06-30 DIAGNOSIS — M79.662 PAIN AND SWELLING OF LEFT LOWER LEG: Primary | ICD-10-CM

## 2025-06-30 PROCEDURE — 99213 OFFICE O/P EST LOW 20 MIN: CPT | Performed by: PHYSICIAN ASSISTANT

## 2025-06-30 RX ORDER — ACYCLOVIR 400 MG/1
400 TABLET ORAL 3 TIMES DAILY PRN
Qty: 90 TABLET | Refills: 0 | Status: SHIPPED | OUTPATIENT
Start: 2025-06-30

## 2025-06-30 SDOH — ECONOMIC STABILITY: FOOD INSECURITY: WITHIN THE PAST 12 MONTHS, YOU WORRIED THAT YOUR FOOD WOULD RUN OUT BEFORE YOU GOT MONEY TO BUY MORE.: NEVER TRUE

## 2025-06-30 SDOH — ECONOMIC STABILITY: FOOD INSECURITY: WITHIN THE PAST 12 MONTHS, THE FOOD YOU BOUGHT JUST DIDN'T LAST AND YOU DIDN'T HAVE MONEY TO GET MORE.: NEVER TRUE

## 2025-06-30 ASSESSMENT — ENCOUNTER SYMPTOMS
CONSTIPATION: 0
EYE PAIN: 0
ABDOMINAL PAIN: 0
SORE THROAT: 0
COLOR CHANGE: 0
SHORTNESS OF BREATH: 0
WHEEZING: 0
DIARRHEA: 0
VOICE CHANGE: 0
CHEST TIGHTNESS: 0
NAUSEA: 0
COUGH: 0
VOMITING: 0

## 2025-06-30 NOTE — PROGRESS NOTES
PROGRESS NOTE    Chief Complaint   Patient presents with    Leg Swelling     Swelling and red spots on legs, painful to touch x 3 weeks.         HPI  History of Present Illness  The patient presents for evaluation of swelling and red spots on her legs.    Swelling and Red Spots on Legs  - Experiencing symptoms for the past 3 weeks  - Affected area is sensitive to touch  - History of leg swelling, but current swelling is new and accompanied by discoloration  - Suspects venous insufficiency as the cause  - Previous consultation suggested cauterization of the saphenous vein and rerouting of blood flow  - Concerned about the clinic's reputation based on online reviews  - Swelling predominantly in the left leg  - Attempted to manage condition with support stockings, found them unhelpful and exacerbating pain  - Ultrasound performed at previous clinic, but does not have the results    Supplemental information: 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 Acknowledges the need to resume walking but finds it challenging due to her work schedule, which can extend up to 15 hours per day.      Past Medical History, Past Surgical History, Family history, Social History, and Medications were all reviewed with the patient today and updated as necessary.       Current Outpatient Medications   Medication Sig Dispense Refill    FLUoxetine (PROZAC) 20 MG capsule Take 3 capsules by mouth daily 90 capsule 0    acyclovir (ZOVIRAX) 400 MG tablet Take 1 tablet by mouth 3 times daily as needed (prn) 90 tablet 0    busPIRone (BUSPAR) 15 MG tablet Take 15 mg by mouth 2 times daily 60 tablet 5    eszopiclone (LUNESTA) 2 MG TABS Take 1 tablet by mouth nightly for 180 days. Max Daily Amount: 2 mg 30 tablet 5    mirtazapine (REMERON) 15 MG tablet Take 0.5 tablets by mouth nightly 30 tablet 5    atorvastatin (LIPITOR) 20 MG tablet Take

## 2025-07-31 ENCOUNTER — OFFICE VISIT (OUTPATIENT)
Dept: BEHAVIORAL/MENTAL HEALTH CLINIC | Age: 60
End: 2025-07-31
Payer: COMMERCIAL

## 2025-07-31 VITALS
TEMPERATURE: 98.1 F | BODY MASS INDEX: 24.67 KG/M2 | HEIGHT: 68 IN | DIASTOLIC BLOOD PRESSURE: 76 MMHG | HEART RATE: 69 BPM | OXYGEN SATURATION: 97 % | WEIGHT: 162.8 LBS | SYSTOLIC BLOOD PRESSURE: 142 MMHG

## 2025-07-31 DIAGNOSIS — F41.1 GENERALIZED ANXIETY DISORDER: ICD-10-CM

## 2025-07-31 DIAGNOSIS — F41.0 PANIC DISORDER: ICD-10-CM

## 2025-07-31 DIAGNOSIS — F51.04 PSYCHOPHYSIOLOGIC INSOMNIA: ICD-10-CM

## 2025-07-31 DIAGNOSIS — F33.1 MODERATE EPISODE OF RECURRENT MAJOR DEPRESSIVE DISORDER (HCC): ICD-10-CM

## 2025-07-31 DIAGNOSIS — F43.10 POST TRAUMATIC STRESS DISORDER (PTSD): ICD-10-CM

## 2025-07-31 PROCEDURE — 90833 PSYTX W PT W E/M 30 MIN: CPT

## 2025-07-31 PROCEDURE — 99214 OFFICE O/P EST MOD 30 MIN: CPT

## 2025-07-31 RX ORDER — BUSPIRONE HYDROCHLORIDE 15 MG/1
15 TABLET ORAL 2 TIMES DAILY
Qty: 60 TABLET | Refills: 3 | Status: SHIPPED | OUTPATIENT
Start: 2025-09-26 | End: 2026-01-24

## 2025-07-31 RX ORDER — ESZOPICLONE 2 MG/1
2 TABLET, FILM COATED ORAL NIGHTLY
Qty: 30 TABLET | Refills: 3 | Status: SHIPPED | OUTPATIENT
Start: 2025-08-21 | End: 2025-12-19

## 2025-07-31 RX ORDER — MIRTAZAPINE 15 MG/1
15 TABLET, FILM COATED ORAL NIGHTLY
Qty: 30 TABLET | Refills: 5 | Status: SHIPPED | OUTPATIENT
Start: 2025-09-15

## 2025-07-31 ASSESSMENT — PATIENT HEALTH QUESTIONNAIRE - PHQ9
9. THOUGHTS THAT YOU WOULD BE BETTER OFF DEAD, OR OF HURTING YOURSELF: NOT AT ALL
1. LITTLE INTEREST OR PLEASURE IN DOING THINGS: NOT AT ALL
5. POOR APPETITE OR OVEREATING: SEVERAL DAYS
2. FEELING DOWN, DEPRESSED OR HOPELESS: NOT AT ALL
3. TROUBLE FALLING OR STAYING ASLEEP: SEVERAL DAYS
8. MOVING OR SPEAKING SO SLOWLY THAT OTHER PEOPLE COULD HAVE NOTICED. OR THE OPPOSITE, BEING SO FIGETY OR RESTLESS THAT YOU HAVE BEEN MOVING AROUND A LOT MORE THAN USUAL: NOT AT ALL
SUM OF ALL RESPONSES TO PHQ QUESTIONS 1-9: 4
SUM OF ALL RESPONSES TO PHQ QUESTIONS 1-9: 4
6. FEELING BAD ABOUT YOURSELF - OR THAT YOU ARE A FAILURE OR HAVE LET YOURSELF OR YOUR FAMILY DOWN: SEVERAL DAYS
SUM OF ALL RESPONSES TO PHQ QUESTIONS 1-9: 4
7. TROUBLE CONCENTRATING ON THINGS, SUCH AS READING THE NEWSPAPER OR WATCHING TELEVISION: NOT AT ALL
4. FEELING TIRED OR HAVING LITTLE ENERGY: SEVERAL DAYS
10. IF YOU CHECKED OFF ANY PROBLEMS, HOW DIFFICULT HAVE THESE PROBLEMS MADE IT FOR YOU TO DO YOUR WORK, TAKE CARE OF THINGS AT HOME, OR GET ALONG WITH OTHER PEOPLE: NOT DIFFICULT AT ALL
SUM OF ALL RESPONSES TO PHQ QUESTIONS 1-9: 4

## 2025-07-31 NOTE — PROGRESS NOTES
Several days Not at all   Worrying too much about different things Several days Several days Not at all   Trouble relaxing Not at all Several days Not at all   Being so restless that it is hard to sit still Not at all Several days Not at all   Becoming easily annoyed or irritable Not at all Several days Not at all   Feeling afraid as if something awful might happen Not at all Not at all Not at all   JEAN CLAUDE-7 Total Score 3 6  0    How difficult have these problems made it for you to do your work, take care of things at home, or get along with other people? Not difficult at all Somewhat difficult Not difficult at all       Patient-reported     Return to Clinic: 3 mos OR sooner if needed  -I have reviewed the [facility] database report for this patient as required for prescription of controlled substances and did not note any discrepancies.  -Medication side effects were discussed and benefits v. risks were presented. Treatment plan was discussed and agreed to by patient.  -Diagnoses considered in this plan are \"working diagnoses\" and subject to change with additional information obtained over the course of additional time spent with patient and/or additional collateral information.  Crisis Plan:  Patient was encouraged to call 911 and /or go to the closest Emergency Department in case of agitation, severe anxiety, psychosis, rachel, suicidal or homicidal urges, worrisome side effects to medications or other emergencies. Patient verbalized understanding of this plan and agreed to   BRIANDA Mendoaz [facility]

## (undated) DEVICE — NEEDLE HYPO 21GA L1.5IN INTRAMUSCULAR S STL LATCH BVL UP

## (undated) DEVICE — SOLUTION IRRIG 1000ML STRL H2O USP PLAS POUR BTL

## (undated) DEVICE — BLADE ES ELASTOMERIC COAT INSUL DURABLE BEND UPTO 90DEG

## (undated) DEVICE — ELECTRODE PT RET AD L9FT HI MOIST COND ADH HYDRGEL CORDED

## (undated) DEVICE — CANISTER, RIGID, 2000CC: Brand: MEDLINE INDUSTRIES, INC.

## (undated) DEVICE — SUTURE MCRYL SZ 3-0 L27IN ABSRB UD L19MM PS-2 3/8 CIR PRIM Y427H

## (undated) DEVICE — GARMENT,MEDLINE,DVT,INT,CALF,MED, GEN2: Brand: MEDLINE

## (undated) DEVICE — CONTAINER,SPECIMEN,O.R.STRL,4.5OZ: Brand: MEDLINE

## (undated) DEVICE — SOLUTION PREP POVIDONE IOD FOR SKIN MUCOUS MEM PRIOR TO

## (undated) DEVICE — ADHESIVE SKIN CLSR 0.7ML TOP DERMBND ADV

## (undated) DEVICE — MINOR SPLIT GENERAL: Brand: MEDLINE INDUSTRIES, INC.

## (undated) DEVICE — SUTURE PERMA-HAND SZ 2-0 L30IN NONABSORBABLE BLK L26MM SH K833H

## (undated) DEVICE — COVER PRB L11.9CM TAPR L3.8X61CM TRNSPAR SFT PLIABLE

## (undated) DEVICE — GLOVE SURG SZ 7 L12IN FNGR THK79MIL GRN LTX FREE

## (undated) DEVICE — 48" PROBE COVER W/GEL, ULTRASOUND, STERILE: Brand: SITE-RITE

## (undated) DEVICE — RETRACTOR SURG NARROW FLAT ULT LTWT ELEV TIP LO PROF

## (undated) DEVICE — SOLUTION SCRB 0.04 CHG DYNA HEX

## (undated) DEVICE — NEEDLE HYPO 25GA L5/8IN ORNG HUB S STL LATCH BVL UP

## (undated) DEVICE — Device

## (undated) DEVICE — GLOVE SURG SZ 65 THK91MIL LTX FREE SYN POLYISOPRENE